# Patient Record
Sex: MALE | Race: ASIAN | Employment: OTHER | URBAN - METROPOLITAN AREA
[De-identification: names, ages, dates, MRNs, and addresses within clinical notes are randomized per-mention and may not be internally consistent; named-entity substitution may affect disease eponyms.]

---

## 2017-05-01 PROBLEM — N40.0 BENIGN NON-NODULAR PROSTATIC HYPERPLASIA WITHOUT LOWER URINARY TRACT SYMPTOMS: Status: ACTIVE | Noted: 2017-05-01

## 2017-05-01 PROBLEM — I70.0 AORTIC ATHEROSCLEROSIS (HCC): Status: ACTIVE | Noted: 2017-05-01

## 2017-05-02 PROCEDURE — 81001 URINALYSIS AUTO W/SCOPE: CPT | Performed by: INTERNAL MEDICINE

## 2017-05-02 PROCEDURE — 82570 ASSAY OF URINE CREATININE: CPT | Performed by: INTERNAL MEDICINE

## 2017-05-02 PROCEDURE — 82746 ASSAY OF FOLIC ACID SERUM: CPT | Performed by: INTERNAL MEDICINE

## 2017-05-02 PROCEDURE — 82607 VITAMIN B-12: CPT | Performed by: INTERNAL MEDICINE

## 2017-05-02 PROCEDURE — 82043 UR ALBUMIN QUANTITATIVE: CPT | Performed by: INTERNAL MEDICINE

## 2017-11-06 PROBLEM — D64.9 CHRONIC ANEMIA: Status: ACTIVE | Noted: 2017-11-06

## 2017-11-06 PROCEDURE — 83036 HEMOGLOBIN GLYCOSYLATED A1C: CPT | Performed by: INTERNAL MEDICINE

## 2017-12-12 ENCOUNTER — APPOINTMENT (OUTPATIENT)
Dept: LAB | Age: 82
End: 2017-12-12
Attending: OPHTHALMOLOGY
Payer: MEDICARE

## 2017-12-12 DIAGNOSIS — H10.9 BACTERIAL CONJUNCTIVITIS: ICD-10-CM

## 2017-12-12 PROCEDURE — 87205 SMEAR GRAM STAIN: CPT

## 2017-12-12 PROCEDURE — 87070 CULTURE OTHR SPECIMN AEROBIC: CPT

## 2017-12-22 ENCOUNTER — APPOINTMENT (OUTPATIENT)
Dept: LAB | Age: 82
End: 2017-12-22
Attending: OPHTHALMOLOGY
Payer: MEDICARE

## 2017-12-22 DIAGNOSIS — H04.332 ACUTE CANALICULITIS OF LEFT LACRIMAL PASSAGE: ICD-10-CM

## 2017-12-22 PROCEDURE — 87077 CULTURE AEROBIC IDENTIFY: CPT

## 2017-12-22 PROCEDURE — 87205 SMEAR GRAM STAIN: CPT

## 2017-12-22 PROCEDURE — 87070 CULTURE OTHR SPECIMN AEROBIC: CPT

## 2018-05-07 PROBLEM — N40.0 BENIGN NON-NODULAR PROSTATIC HYPERPLASIA WITHOUT LOWER URINARY TRACT SYMPTOMS: Status: RESOLVED | Noted: 2017-05-01 | Resolved: 2018-05-07

## 2018-05-07 PROBLEM — N18.30 CKD (CHRONIC KIDNEY DISEASE) STAGE 3, GFR 30-59 ML/MIN (HCC): Status: ACTIVE | Noted: 2018-05-07

## 2018-05-07 PROBLEM — R09.82 POSTNASAL DRIP: Status: ACTIVE | Noted: 2018-05-07

## 2018-06-26 PROBLEM — D69.6 THROMBOCYTOPENIA (HCC): Status: ACTIVE | Noted: 2018-06-26

## 2018-06-26 PROBLEM — N18.30 CKD (CHRONIC KIDNEY DISEASE) STAGE 3, GFR 30-59 ML/MIN (HCC): Status: RESOLVED | Noted: 2018-05-07 | Resolved: 2018-06-26

## 2018-06-26 PROCEDURE — 82043 UR ALBUMIN QUANTITATIVE: CPT | Performed by: INTERNAL MEDICINE

## 2018-06-26 PROCEDURE — 82746 ASSAY OF FOLIC ACID SERUM: CPT | Performed by: INTERNAL MEDICINE

## 2018-06-26 PROCEDURE — 82570 ASSAY OF URINE CREATININE: CPT | Performed by: INTERNAL MEDICINE

## 2018-06-26 PROCEDURE — 82607 VITAMIN B-12: CPT | Performed by: INTERNAL MEDICINE

## 2018-06-26 PROCEDURE — 81001 URINALYSIS AUTO W/SCOPE: CPT | Performed by: INTERNAL MEDICINE

## 2018-06-27 PROCEDURE — 36415 COLL VENOUS BLD VENIPUNCTURE: CPT | Performed by: INTERNAL MEDICINE

## 2018-06-27 PROCEDURE — 83036 HEMOGLOBIN GLYCOSYLATED A1C: CPT | Performed by: INTERNAL MEDICINE

## 2018-06-28 PROBLEM — E53.8 LOW FOLATE: Status: ACTIVE | Noted: 2018-06-28

## 2018-11-20 PROBLEM — R63.4 WEIGHT LOSS: Status: ACTIVE | Noted: 2018-11-20

## 2018-11-20 PROBLEM — R41.3 MEMORY LOSS: Status: ACTIVE | Noted: 2018-11-20

## 2018-11-20 PROCEDURE — 86334 IMMUNOFIX E-PHORESIS SERUM: CPT | Performed by: INTERNAL MEDICINE

## 2018-11-20 PROCEDURE — 84165 PROTEIN E-PHORESIS SERUM: CPT | Performed by: INTERNAL MEDICINE

## 2018-11-20 PROCEDURE — 83883 ASSAY NEPHELOMETRY NOT SPEC: CPT | Performed by: INTERNAL MEDICINE

## 2018-11-21 PROCEDURE — 36415 COLL VENOUS BLD VENIPUNCTURE: CPT | Performed by: INTERNAL MEDICINE

## 2018-11-21 PROCEDURE — 83036 HEMOGLOBIN GLYCOSYLATED A1C: CPT | Performed by: INTERNAL MEDICINE

## 2019-02-20 PROCEDURE — 86334 IMMUNOFIX E-PHORESIS SERUM: CPT | Performed by: INTERNAL MEDICINE

## 2019-02-20 PROCEDURE — 83883 ASSAY NEPHELOMETRY NOT SPEC: CPT | Performed by: INTERNAL MEDICINE

## 2019-02-20 PROCEDURE — 84165 PROTEIN E-PHORESIS SERUM: CPT | Performed by: INTERNAL MEDICINE

## 2019-02-20 PROCEDURE — 83036 HEMOGLOBIN GLYCOSYLATED A1C: CPT | Performed by: INTERNAL MEDICINE

## 2019-06-19 PROBLEM — R09.82 POSTNASAL DRIP: Status: RESOLVED | Noted: 2018-05-07 | Resolved: 2019-06-19

## 2019-06-19 PROBLEM — R63.4 WEIGHT LOSS: Status: RESOLVED | Noted: 2018-11-20 | Resolved: 2019-06-19

## 2019-06-20 PROCEDURE — 83036 HEMOGLOBIN GLYCOSYLATED A1C: CPT | Performed by: INTERNAL MEDICINE

## 2019-10-01 PROCEDURE — 83036 HEMOGLOBIN GLYCOSYLATED A1C: CPT | Performed by: INTERNAL MEDICINE

## 2019-12-11 PROBLEM — F32.9 MAJOR DEPRESSION: Status: ACTIVE | Noted: 2019-12-11

## 2020-07-16 PROBLEM — R41.3 MEMORY LOSS: Status: RESOLVED | Noted: 2018-11-20 | Resolved: 2020-07-16

## 2020-07-16 PROBLEM — F03.90 DEMENTIA WITHOUT BEHAVIORAL DISTURBANCE, UNSPECIFIED DEMENTIA TYPE: Status: ACTIVE | Noted: 2020-07-16

## 2020-07-16 PROBLEM — F03.90 DEMENTIA WITHOUT BEHAVIORAL DISTURBANCE, UNSPECIFIED DEMENTIA TYPE (HCC): Status: ACTIVE | Noted: 2020-07-16

## 2021-05-17 PROBLEM — F32.9 MAJOR DEPRESSION: Status: RESOLVED | Noted: 2019-12-11 | Resolved: 2021-05-17

## 2021-05-17 PROBLEM — F32.A MILD DEPRESSION: Status: ACTIVE | Noted: 2021-05-17

## 2021-06-03 PROBLEM — H90.3 SENSORY HEARING LOSS, BILATERAL: Status: ACTIVE | Noted: 2021-06-03

## 2021-06-03 PROBLEM — H61.23 IMPACTED CERUMEN, BILATERAL: Status: ACTIVE | Noted: 2021-06-03

## 2022-09-04 ENCOUNTER — APPOINTMENT (OUTPATIENT)
Dept: CT IMAGING | Facility: HOSPITAL | Age: 87
End: 2022-09-04
Attending: EMERGENCY MEDICINE

## 2022-09-04 ENCOUNTER — APPOINTMENT (OUTPATIENT)
Dept: GENERAL RADIOLOGY | Facility: HOSPITAL | Age: 87
DRG: 057 | End: 2022-09-04
Attending: EMERGENCY MEDICINE

## 2022-09-04 ENCOUNTER — APPOINTMENT (OUTPATIENT)
Dept: GENERAL RADIOLOGY | Facility: HOSPITAL | Age: 87
End: 2022-09-04
Attending: EMERGENCY MEDICINE

## 2022-09-04 ENCOUNTER — APPOINTMENT (OUTPATIENT)
Dept: CT IMAGING | Facility: HOSPITAL | Age: 87
DRG: 057 | End: 2022-09-04
Attending: EMERGENCY MEDICINE

## 2022-09-04 ENCOUNTER — HOSPITAL ENCOUNTER (INPATIENT)
Facility: HOSPITAL | Age: 87
LOS: 1 days | Discharge: HOME | DRG: 057 | End: 2022-09-05
Attending: EMERGENCY MEDICINE | Admitting: HOSPITALIST

## 2022-09-04 ENCOUNTER — HOSPITAL ENCOUNTER (OUTPATIENT)
Facility: HOSPITAL | Age: 87
Setting detail: OBSERVATION
Discharge: HOME | DRG: 057 | End: 2022-09-05
Attending: EMERGENCY MEDICINE | Admitting: HOSPITALIST

## 2022-09-04 ENCOUNTER — HOSPITAL ENCOUNTER (INPATIENT)
Facility: HOSPITAL | Age: 87
LOS: 1 days | Discharge: HOME | End: 2022-09-05
Attending: EMERGENCY MEDICINE | Admitting: HOSPITALIST

## 2022-09-04 DIAGNOSIS — G47.00 INSOMNIA, UNSPECIFIED TYPE: ICD-10-CM

## 2022-09-04 DIAGNOSIS — R29.6 FALLS FREQUENTLY: ICD-10-CM

## 2022-09-04 DIAGNOSIS — R41.82 ALTERED MENTAL STATUS, UNSPECIFIED ALTERED MENTAL STATUS TYPE: Primary | ICD-10-CM

## 2022-09-04 LAB
ALBUMIN SERPL-MCNC: 3.1 G/DL (ref 3.4–5)
ALBUMIN/GLOB SERPL: 0.8 {RATIO} (ref 1–2)
ALP LIVER SERPL-CCNC: 49 U/L
ALT SERPL-CCNC: 25 U/L
ANION GAP SERPL CALC-SCNC: 5 MMOL/L (ref 0–18)
AST SERPL-CCNC: 22 U/L (ref 15–37)
BASOPHILS # BLD AUTO: 0.04 X10(3) UL (ref 0–0.2)
BASOPHILS NFR BLD AUTO: 0.6 %
BILIRUB SERPL-MCNC: 0.3 MG/DL (ref 0.1–2)
BILIRUB UR QL STRIP.AUTO: NEGATIVE
BUN BLD-MCNC: 16 MG/DL (ref 7–18)
CALCIUM BLD-MCNC: 8.8 MG/DL (ref 8.5–10.1)
CHLORIDE SERPL-SCNC: 98 MMOL/L (ref 98–112)
CLARITY UR REFRACT.AUTO: CLEAR
CO2 SERPL-SCNC: 30 MMOL/L (ref 21–32)
COLOR UR AUTO: YELLOW
CREAT BLD-MCNC: 1.1 MG/DL
EOSINOPHIL # BLD AUTO: 0.11 X10(3) UL (ref 0–0.7)
EOSINOPHIL NFR BLD AUTO: 1.8 %
ERYTHROCYTE [DISTWIDTH] IN BLOOD BY AUTOMATED COUNT: 12.9 %
GFR SERPLBLD BASED ON 1.73 SQ M-ARVRAT: 64 ML/MIN/1.73M2 (ref 60–?)
GLOBULIN PLAS-MCNC: 3.8 G/DL (ref 2.8–4.4)
GLUCOSE BLD-MCNC: 126 MG/DL (ref 70–99)
GLUCOSE BLD-MCNC: 160 MG/DL (ref 70–99)
GLUCOSE BLD-MCNC: 167 MG/DL (ref 70–99)
GLUCOSE UR STRIP.AUTO-MCNC: 250 MG/DL
HCT VFR BLD AUTO: 36.5 %
HGB BLD-MCNC: 12 G/DL
IMM GRANULOCYTES # BLD AUTO: 0.01 X10(3) UL (ref 0–1)
IMM GRANULOCYTES NFR BLD: 0.2 %
KETONES UR STRIP.AUTO-MCNC: NEGATIVE MG/DL
LEUKOCYTE ESTERASE UR QL STRIP.AUTO: NEGATIVE
LYMPHOCYTES # BLD AUTO: 1.69 X10(3) UL (ref 1–4)
LYMPHOCYTES NFR BLD AUTO: 27.3 %
MCH RBC QN AUTO: 32 PG (ref 26–34)
MCHC RBC AUTO-ENTMCNC: 32.9 G/DL (ref 31–37)
MCV RBC AUTO: 97.3 FL
MONOCYTES # BLD AUTO: 0.4 X10(3) UL (ref 0.1–1)
MONOCYTES NFR BLD AUTO: 6.5 %
NEUTROPHILS # BLD AUTO: 3.93 X10 (3) UL (ref 1.5–7.7)
NEUTROPHILS # BLD AUTO: 3.93 X10(3) UL (ref 1.5–7.7)
NEUTROPHILS NFR BLD AUTO: 63.6 %
NITRITE UR QL STRIP.AUTO: NEGATIVE
NT-PROBNP SERPL-MCNC: 401 PG/ML (ref ?–450)
OSMOLALITY SERPL CALC.SUM OF ELEC: 281 MOSM/KG (ref 275–295)
PH UR STRIP.AUTO: 6.5 [PH] (ref 5–8)
PLATELET # BLD AUTO: 199 10(3)UL (ref 150–450)
POTASSIUM SERPL-SCNC: 4 MMOL/L (ref 3.5–5.1)
PROT SERPL-MCNC: 6.9 G/DL (ref 6.4–8.2)
PROT UR STRIP.AUTO-MCNC: NEGATIVE MG/DL
RBC # BLD AUTO: 3.75 X10(6)UL
RBC UR QL AUTO: NEGATIVE
SARS-COV-2 RNA RESP QL NAA+PROBE: NOT DETECTED
SODIUM SERPL-SCNC: 133 MMOL/L (ref 136–145)
SP GR UR STRIP.AUTO: 1.02 (ref 1–1.03)
TROPONIN I HIGH SENSITIVITY: 16 NG/L
UROBILINOGEN UR STRIP.AUTO-MCNC: 0.2 MG/DL
WBC # BLD AUTO: 6.2 X10(3) UL (ref 4–11)

## 2022-09-04 PROCEDURE — 85025 COMPLETE CBC W/AUTO DIFF WBC: CPT | Performed by: EMERGENCY MEDICINE

## 2022-09-04 PROCEDURE — 36415 COLL VENOUS BLD VENIPUNCTURE: CPT

## 2022-09-04 PROCEDURE — 71045 X-RAY EXAM CHEST 1 VIEW: CPT | Performed by: EMERGENCY MEDICINE

## 2022-09-04 PROCEDURE — 82607 VITAMIN B-12: CPT | Performed by: OTHER

## 2022-09-04 PROCEDURE — 83880 ASSAY OF NATRIURETIC PEPTIDE: CPT | Performed by: EMERGENCY MEDICINE

## 2022-09-04 PROCEDURE — 93005 ELECTROCARDIOGRAM TRACING: CPT

## 2022-09-04 PROCEDURE — 81003 URINALYSIS AUTO W/O SCOPE: CPT | Performed by: EMERGENCY MEDICINE

## 2022-09-04 PROCEDURE — 80053 COMPREHEN METABOLIC PANEL: CPT | Performed by: EMERGENCY MEDICINE

## 2022-09-04 PROCEDURE — 84484 ASSAY OF TROPONIN QUANT: CPT | Performed by: EMERGENCY MEDICINE

## 2022-09-04 PROCEDURE — 82962 GLUCOSE BLOOD TEST: CPT

## 2022-09-04 PROCEDURE — 70450 CT HEAD/BRAIN W/O DYE: CPT | Performed by: EMERGENCY MEDICINE

## 2022-09-04 PROCEDURE — 99285 EMERGENCY DEPT VISIT HI MDM: CPT

## 2022-09-04 PROCEDURE — 72100 X-RAY EXAM L-S SPINE 2/3 VWS: CPT | Performed by: EMERGENCY MEDICINE

## 2022-09-04 RX ORDER — DEXTROSE MONOHYDRATE 25 G/50ML
50 INJECTION, SOLUTION INTRAVENOUS
Status: DISCONTINUED | OUTPATIENT
Start: 2022-09-04 | End: 2022-09-05

## 2022-09-04 RX ORDER — MEMANTINE HYDROCHLORIDE 10 MG/1
10 TABLET ORAL EVERY EVENING
Status: DISCONTINUED | OUTPATIENT
Start: 2022-09-04 | End: 2022-09-05

## 2022-09-04 RX ORDER — MEMANTINE HYDROCHLORIDE 5 MG/1
5 TABLET ORAL DAILY
Status: DISCONTINUED | OUTPATIENT
Start: 2022-09-04 | End: 2022-09-05

## 2022-09-04 RX ORDER — MEMANTINE HYDROCHLORIDE 21 MG/1
21 CAPSULE, EXTENDED RELEASE ORAL DAILY
COMMUNITY
Start: 2022-08-27

## 2022-09-04 RX ORDER — QUETIAPINE FUMARATE 25 MG/1
50 TABLET, FILM COATED ORAL NIGHTLY
Status: DISCONTINUED | OUTPATIENT
Start: 2022-09-04 | End: 2022-09-04

## 2022-09-04 RX ORDER — NICOTINE POLACRILEX 4 MG
30 LOZENGE BUCCAL
Status: DISCONTINUED | OUTPATIENT
Start: 2022-09-04 | End: 2022-09-05

## 2022-09-04 RX ORDER — BISACODYL 10 MG
10 SUPPOSITORY, RECTAL RECTAL
Status: DISCONTINUED | OUTPATIENT
Start: 2022-09-04 | End: 2022-09-05

## 2022-09-04 RX ORDER — ACETAMINOPHEN 500 MG
500 TABLET ORAL EVERY 4 HOURS PRN
Status: DISCONTINUED | OUTPATIENT
Start: 2022-09-04 | End: 2022-09-05

## 2022-09-04 RX ORDER — HYDRALAZINE HYDROCHLORIDE 20 MG/ML
10 INJECTION INTRAMUSCULAR; INTRAVENOUS EVERY 6 HOURS PRN
Status: DISCONTINUED | OUTPATIENT
Start: 2022-09-04 | End: 2022-09-05

## 2022-09-04 RX ORDER — SENNOSIDES 8.6 MG
17.2 TABLET ORAL NIGHTLY PRN
Status: DISCONTINUED | OUTPATIENT
Start: 2022-09-04 | End: 2022-09-05

## 2022-09-04 RX ORDER — ENOXAPARIN SODIUM 100 MG/ML
40 INJECTION SUBCUTANEOUS DAILY
Status: DISCONTINUED | OUTPATIENT
Start: 2022-09-05 | End: 2022-09-05

## 2022-09-04 RX ORDER — QUETIAPINE FUMARATE 25 MG/1
25 TABLET, FILM COATED ORAL NIGHTLY
Status: DISCONTINUED | OUTPATIENT
Start: 2022-09-04 | End: 2022-09-05

## 2022-09-04 RX ORDER — POLYETHYLENE GLYCOL 3350 17 G/17G
17 POWDER, FOR SOLUTION ORAL DAILY PRN
Status: DISCONTINUED | OUTPATIENT
Start: 2022-09-04 | End: 2022-09-05

## 2022-09-04 RX ORDER — SODIUM PHOSPHATE, DIBASIC AND SODIUM PHOSPHATE, MONOBASIC 7; 19 G/133ML; G/133ML
1 ENEMA RECTAL ONCE AS NEEDED
Status: DISCONTINUED | OUTPATIENT
Start: 2022-09-04 | End: 2022-09-05

## 2022-09-04 RX ORDER — NICOTINE POLACRILEX 4 MG
15 LOZENGE BUCCAL
Status: DISCONTINUED | OUTPATIENT
Start: 2022-09-04 | End: 2022-09-05

## 2022-09-04 RX ORDER — LORAZEPAM 2 MG/ML
0.5 CONCENTRATE ORAL NIGHTLY PRN
Status: DISCONTINUED | OUTPATIENT
Start: 2022-09-04 | End: 2022-09-05

## 2022-09-04 NOTE — ED QUICK NOTES
Orders for admission, patient is aware of plan and ready to go upstairs. Any questions, please call ED RN Shaila Domínguez at extension [phone]. Vaccinated? Yes  Type of COVID test sent: Rapid - Not detected  COVID Suspicion level: Low      Titratable drug(s) infusing:  Rate:    LOC at time of transport: Disoriented;  Alzheimer's    Other pertinent information:    CIWA score=  NIH score=

## 2022-09-05 VITALS
HEIGHT: 67 IN | OXYGEN SATURATION: 93 % | TEMPERATURE: 99 F | HEART RATE: 75 BPM | BODY MASS INDEX: 18.21 KG/M2 | DIASTOLIC BLOOD PRESSURE: 60 MMHG | SYSTOLIC BLOOD PRESSURE: 158 MMHG | WEIGHT: 116 LBS | RESPIRATION RATE: 18 BRPM

## 2022-09-05 LAB
ATRIAL RATE: 49 BPM
GLUCOSE BLD-MCNC: 144 MG/DL (ref 70–99)
GLUCOSE BLD-MCNC: 243 MG/DL (ref 70–99)
P AXIS: 43 DEGREES
P-R INTERVAL: 176 MS
Q-T INTERVAL: 470 MS
QRS DURATION: 142 MS
QTC CALCULATION (BEZET): 424 MS
R AXIS: 14 DEGREES
T AXIS: 12 DEGREES
VENTRICULAR RATE: 49 BPM
VIT B12 SERPL-MCNC: 740 PG/ML (ref 193–986)

## 2022-09-05 PROCEDURE — 97161 PT EVAL LOW COMPLEX 20 MIN: CPT

## 2022-09-05 PROCEDURE — 97116 GAIT TRAINING THERAPY: CPT

## 2022-09-05 PROCEDURE — 82962 GLUCOSE BLOOD TEST: CPT

## 2022-09-05 RX ORDER — QUETIAPINE FUMARATE 25 MG/1
TABLET, FILM COATED ORAL
Qty: 30 TABLET | Refills: 0 | Status: SHIPPED | OUTPATIENT
Start: 2022-09-05 | End: 2022-09-05

## 2022-09-05 RX ORDER — QUETIAPINE FUMARATE 25 MG/1
TABLET, FILM COATED ORAL
Qty: 30 TABLET | Refills: 0 | Status: SHIPPED | OUTPATIENT
Start: 2022-09-05

## 2022-09-05 NOTE — PLAN OF CARE
Assumed patient care at [address] [place] [address]. Patient is awake and impulsive. Unable to assess patient's orientation d/t patient being non verbal most of the time. Hx of Alzheimer's. Patient keep trying to get out of bed and keeps removing tele monitor. Posey vest and soft wrist restraints in place for safety (exp 1830). Room air. NSR/ST on Tele. (HR 's). Afebrile. Regular diet. Accu checks QID. Up x 1-2 assist to the bathroom. PT/OT on consult. Fall precautions in place. All needs met at this time. Problem: Diabetes/Glucose Control  Goal: Glucose maintained within prescribed range  Description: INTERVENTIONS:  - Monitor Blood Glucose as ordered  - Assess for signs and symptoms of hyperglycemia and hypoglycemia  - Administer ordered medications to maintain glucose within target range  - Assess barriers to adequate nutritional intake and initiate nutrition consult as needed  - Instruct patient on self management of diabetes  Outcome: Progressing     Problem: Patient/Family Goals  Goal: Patient/Family Long Term Goal  Description: Patient's Long Term Goal: Discharge    Interventions:  - MD Consults  - Fall precautions  - Give meds per protocol  - See additional Care Plan goals for specific interventions  Outcome: Progressing  Goal: Patient/Family Short Term Goal  Description: Patient's Short Term Goal: Prevent fall/injury. Interventions:   - Non violent restraints.   - Fall precautions in place.   - Room close to nurse station.  - Bed in lowest position.  - See additional Care Plan goals for specific interventions  Outcome: Progressing     Problem: Safety Risk - Non-Violent Restraints  Goal: Patient will remain free from self-harm  Description: INTERVENTIONS:  - Apply the least restrictive restraint to prevent harm  - Notify patient and family of reasons restraints applied  - Assess for any contributing factors to confusion (electrolyte disturbances, delirium, medications)  - Discontinue any unnecessary medical devices as soon as possible  - Assess the patient's physical comfort, circulation, skin condition, hydration, nutrition and elimination needs   - Reorient and redirection as needed  - Assess for the need to continue restraints  Outcome: Progressing     Problem: Delirium  Goal: Minimize duration of delirium  Description: Interventions:  - Encourage use of hearing aids, eye glasses  - Promote highest level of mobility daily  - Provide frequent reorientation  - Promote wakefulness i.e. lights on, blinds open  - Promote sleep, encourage patient's normal rest cycle i.e. lights off, TV off, minimize noise and interruptions  - Encourage family to assist in orientation and promotion of home routines  Outcome: Progressing

## 2022-09-05 NOTE — BH PROGRESS NOTE
Referral placed in the discharge summary for the patient to follow up with a Psychiatrist, Dr. Andie Longoria.

## 2022-09-05 NOTE — CM/SW NOTE
09/05/22 1600   CM/SW Referral Data   Referral Source Physician   Reason for Referral Discharge planning   Informant Son   Patient Info   Patient's Current Mental Status at Time of Assessment Alert;Confused or unable to complete assessment   Patient Communication Issues Language barrier   Patient's Home Environment Condo/Apt with elevator   Number of Levels in Home 1   Patient lives with Spouse/Significant other   Patient Status Prior to Admission   Independent with ADLs and Mobility No   Pt. requires assistance with Housework;Driving;Meals; Bathing;Dressing;Medications; Toileting   Services in place prior to admission DME/Supplies at home   Type of DME/Supplies Hollie Gravely   Discharge Needs   Anticipated D/C needs Caregiver services     MDO received for POLST. Spoke w/ son Mima Mckinley for assessment. PT recommending HHC/24 hr supervision. Pt and pt's wife will be staying w/ son Mima Mckinley in [place] for about a week or more if needed. Son requested caregiver resources - provided. Declined HHC services. SW recommend son to f/u with pt's PCP for f/u care and HHC if need in the future. DNAR listed, will need POLST to be completed - not on file. Son to f/u with pt's PCP. SW/CM to remain available for support and/or discharge planning.      Colleen Easley, LSW   Pager: [phone] (9/5/22 only)

## 2022-09-05 NOTE — PROGRESS NOTES
NURSING ADMISSION NOTE      Patient admitted via Cart  Oriented to room. Safety precautions initiated. Bed in low position. Call light in reach. Received patient around 56, accompanied by son Corie Hughes. Pt is nonverbal, is restless, and continues to get out of bed, pt removed gown and tele. Pt speaks Mandarin, son helped answered questions in the navigator. Staff assisted pt to the bathroom. Received orders for vest restraints and soft limb restraints, son was present at bedside when restraints applied.

## 2022-09-05 NOTE — PLAN OF CARE
Pt discharged home via wheelchair accompanied by his son. Prescriptions and discharge instructions given with son verbalizing understanding.

## 2022-09-05 NOTE — PROGRESS NOTES
PSYCH CONSULT    Date of Admission: 9/4/22  Date of Consult: 9/5/22  Reason for Consultation: Dementia    Impression:  Primary Psychiatric Diagnosis:  Severe major neurocognitive disorder. Anxiety disorder unspecified. Rule out acute delirium/encephalopathy due to poor sleep quality. Pertinent Medical Diagnoses:  DM2. HTN. Recommendations:  1) Continue Seroquel but change instructions to 12.5mg to 25mg nightly PRN insomnia or agitation. 2) Continue Zoloft 50mg daily for anxiety. 3) He can be discharged home from the psych perspective. There is no indication for the inpatient psych unit. Recommend that he establish care with a mare-psychiatrist. Discussed plan with patient's wife and son Michael Phillips. Psych Instructions:    1) Start Seroquel/Quetiapine 25mg tabs-- give him 1/2 to 1 tab nightly as needed for insomnia or agitation. 2) Make an appointment with Dr. William Monae, an excellent geriatric psychiatrist who is an expert in dementia. 3) You are taking great care of him. Continue 24 hr supervision. Agree with him living at your house so there is more help. Agree with hiring a caregiver at night to help your mom rest.     4) Continue to play Mahjong with him since he enjoys it and is still able to play. Full note to follow.     Dr. Abel Tracey

## 2022-09-13 NOTE — PAYOR COMM NOTE
--------------  DISCHARGE REVIEW    Payor: Maxim Valencia #:  [id number]  Authorization Number: [id number]    Admit date: 9/4/22  Admit time:   5:49 PM  Discharge Date: 9/5/2022  4:54 PM     Admitting Physician: Zak Delvalle MD  Attending Physician:  No att. providers found  Primary Care Physician: Elisabeth Smith MD    REVIEWER COMMENTS    FOR FINAL REVIEW/APPROVAL OF ALL INPT DAYS

## 2023-05-12 PROBLEM — F03.90 DEMENTIA WITHOUT BEHAVIORAL DISTURBANCE (HCC): Status: ACTIVE | Noted: 2020-07-16

## 2023-08-02 ENCOUNTER — HOSPITAL ENCOUNTER (OUTPATIENT)
Facility: HOSPITAL | Age: 88
Setting detail: OBSERVATION
Discharge: NURSING, REHAB OR LONG-TERM CARE | End: 2023-08-05
Attending: EMERGENCY MEDICINE | Admitting: HOSPITALIST
Payer: MEDICARE

## 2023-08-02 DIAGNOSIS — K92.2 GASTROINTESTINAL HEMORRHAGE, UNSPECIFIED GASTROINTESTINAL HEMORRHAGE TYPE: Primary | ICD-10-CM

## 2023-08-02 LAB
ALBUMIN SERPL-MCNC: 3.6 G/DL (ref 3.4–5)
ALBUMIN/GLOB SERPL: 1 {RATIO} (ref 1–2)
ALP LIVER SERPL-CCNC: 50 U/L
ALT SERPL-CCNC: 32 U/L
ANION GAP SERPL CALC-SCNC: 3 MMOL/L (ref 0–18)
ANTIBODY SCREEN: NEGATIVE
APTT PPP: 29.6 SECONDS (ref 23.3–35.6)
AST SERPL-CCNC: 24 U/L (ref 15–37)
BASOPHILS # BLD AUTO: 0.07 X10(3) UL (ref 0–0.2)
BASOPHILS NFR BLD AUTO: 0.7 %
BILIRUB SERPL-MCNC: 0.4 MG/DL (ref 0.1–2)
BUN BLD-MCNC: 21 MG/DL (ref 7–18)
CALCIUM BLD-MCNC: 9 MG/DL (ref 8.5–10.1)
CHLORIDE SERPL-SCNC: 101 MMOL/L (ref 98–112)
CO2 SERPL-SCNC: 32 MMOL/L (ref 21–32)
CREAT BLD-MCNC: 1.02 MG/DL
EGFRCR SERPLBLD CKD-EPI 2021: 70 ML/MIN/1.73M2 (ref 60–?)
EOSINOPHIL # BLD AUTO: 0.18 X10(3) UL (ref 0–0.7)
EOSINOPHIL NFR BLD AUTO: 1.8 %
ERYTHROCYTE [DISTWIDTH] IN BLOOD BY AUTOMATED COUNT: 12.9 %
GLOBULIN PLAS-MCNC: 3.6 G/DL (ref 2.8–4.4)
GLUCOSE BLD-MCNC: 107 MG/DL (ref 70–99)
HCT VFR BLD AUTO: 32.6 %
HGB BLD-MCNC: 10.7 G/DL
IMM GRANULOCYTES # BLD AUTO: 0.02 X10(3) UL (ref 0–1)
IMM GRANULOCYTES NFR BLD: 0.2 %
INR BLD: 1.09 (ref 0.85–1.16)
LYMPHOCYTES # BLD AUTO: 2.67 X10(3) UL (ref 1–4)
LYMPHOCYTES NFR BLD AUTO: 27.1 %
MCH RBC QN AUTO: 31.8 PG (ref 26–34)
MCHC RBC AUTO-ENTMCNC: 32.8 G/DL (ref 31–37)
MCV RBC AUTO: 96.7 FL
MONOCYTES # BLD AUTO: 0.59 X10(3) UL (ref 0.1–1)
MONOCYTES NFR BLD AUTO: 6 %
NEUTROPHILS # BLD AUTO: 6.34 X10 (3) UL (ref 1.5–7.7)
NEUTROPHILS # BLD AUTO: 6.34 X10(3) UL (ref 1.5–7.7)
NEUTROPHILS NFR BLD AUTO: 64.2 %
OSMOLALITY SERPL CALC.SUM OF ELEC: 285 MOSM/KG (ref 275–295)
PLATELET # BLD AUTO: 201 10(3)UL (ref 150–450)
POTASSIUM SERPL-SCNC: 3.9 MMOL/L (ref 3.5–5.1)
PROT SERPL-MCNC: 7.2 G/DL (ref 6.4–8.2)
PROTHROMBIN TIME: 14.1 SECONDS (ref 11.6–14.8)
RBC # BLD AUTO: 3.37 X10(6)UL
RH BLOOD TYPE: POSITIVE
SARS-COV-2 RNA RESP QL NAA+PROBE: NOT DETECTED
SODIUM SERPL-SCNC: 136 MMOL/L (ref 136–145)
WBC # BLD AUTO: 9.9 X10(3) UL (ref 4–11)

## 2023-08-02 PROCEDURE — 86900 BLOOD TYPING SEROLOGIC ABO: CPT | Performed by: EMERGENCY MEDICINE

## 2023-08-02 PROCEDURE — 96374 THER/PROPH/DIAG INJ IV PUSH: CPT

## 2023-08-02 PROCEDURE — 85025 COMPLETE CBC W/AUTO DIFF WBC: CPT

## 2023-08-02 PROCEDURE — 86901 BLOOD TYPING SEROLOGIC RH(D): CPT

## 2023-08-02 PROCEDURE — C9113 INJ PANTOPRAZOLE SODIUM, VIA: HCPCS | Performed by: EMERGENCY MEDICINE

## 2023-08-02 PROCEDURE — 86850 RBC ANTIBODY SCREEN: CPT

## 2023-08-02 PROCEDURE — 80053 COMPREHEN METABOLIC PANEL: CPT | Performed by: EMERGENCY MEDICINE

## 2023-08-02 PROCEDURE — 85730 THROMBOPLASTIN TIME PARTIAL: CPT | Performed by: EMERGENCY MEDICINE

## 2023-08-02 PROCEDURE — 93010 ELECTROCARDIOGRAM REPORT: CPT

## 2023-08-02 PROCEDURE — 99285 EMERGENCY DEPT VISIT HI MDM: CPT

## 2023-08-02 PROCEDURE — 86850 RBC ANTIBODY SCREEN: CPT | Performed by: EMERGENCY MEDICINE

## 2023-08-02 PROCEDURE — 80053 COMPREHEN METABOLIC PANEL: CPT

## 2023-08-02 PROCEDURE — 86901 BLOOD TYPING SEROLOGIC RH(D): CPT | Performed by: EMERGENCY MEDICINE

## 2023-08-02 PROCEDURE — 86900 BLOOD TYPING SEROLOGIC ABO: CPT

## 2023-08-02 PROCEDURE — 93005 ELECTROCARDIOGRAM TRACING: CPT

## 2023-08-02 PROCEDURE — 85610 PROTHROMBIN TIME: CPT | Performed by: EMERGENCY MEDICINE

## 2023-08-02 PROCEDURE — 85025 COMPLETE CBC W/AUTO DIFF WBC: CPT | Performed by: EMERGENCY MEDICINE

## 2023-08-03 ENCOUNTER — APPOINTMENT (OUTPATIENT)
Dept: CV DIAGNOSTICS | Facility: HOSPITAL | Age: 88
End: 2023-08-03
Attending: HOSPITALIST
Payer: MEDICARE

## 2023-08-03 LAB
ANION GAP SERPL CALC-SCNC: 5 MMOL/L (ref 0–18)
ANION GAP SERPL CALC-SCNC: 5 MMOL/L (ref 0–18)
ATRIAL RATE: 51 BPM
BASOPHILS # BLD AUTO: 0.05 X10(3) UL (ref 0–0.2)
BASOPHILS # BLD AUTO: 0.07 X10(3) UL (ref 0–0.2)
BASOPHILS NFR BLD AUTO: 0.7 %
BASOPHILS NFR BLD AUTO: 1 %
BUN BLD-MCNC: 14 MG/DL (ref 7–18)
BUN BLD-MCNC: 16 MG/DL (ref 7–18)
CALCIUM BLD-MCNC: 8.6 MG/DL (ref 8.5–10.1)
CALCIUM BLD-MCNC: 8.6 MG/DL (ref 8.5–10.1)
CHLORIDE SERPL-SCNC: 104 MMOL/L (ref 98–112)
CHLORIDE SERPL-SCNC: 106 MMOL/L (ref 98–112)
CO2 SERPL-SCNC: 25 MMOL/L (ref 21–32)
CO2 SERPL-SCNC: 26 MMOL/L (ref 21–32)
CREAT BLD-MCNC: 0.82 MG/DL
CREAT BLD-MCNC: 1.03 MG/DL
EGFRCR SERPLBLD CKD-EPI 2021: 69 ML/MIN/1.73M2 (ref 60–?)
EGFRCR SERPLBLD CKD-EPI 2021: 84 ML/MIN/1.73M2 (ref 60–?)
EOSINOPHIL # BLD AUTO: 0.2 X10(3) UL (ref 0–0.7)
EOSINOPHIL # BLD AUTO: 0.22 X10(3) UL (ref 0–0.7)
EOSINOPHIL NFR BLD AUTO: 2.8 %
EOSINOPHIL NFR BLD AUTO: 3.1 %
ERYTHROCYTE [DISTWIDTH] IN BLOOD BY AUTOMATED COUNT: 12.8 %
ERYTHROCYTE [DISTWIDTH] IN BLOOD BY AUTOMATED COUNT: 12.9 %
GLUCOSE BLD-MCNC: 115 MG/DL (ref 70–99)
GLUCOSE BLD-MCNC: 122 MG/DL (ref 70–99)
GLUCOSE BLD-MCNC: 124 MG/DL (ref 70–99)
GLUCOSE BLD-MCNC: 125 MG/DL (ref 70–99)
GLUCOSE BLD-MCNC: 126 MG/DL (ref 70–99)
GLUCOSE BLD-MCNC: 128 MG/DL (ref 70–99)
HCT VFR BLD AUTO: 33.6 %
HCT VFR BLD AUTO: 34.6 %
HGB BLD-MCNC: 10.7 G/DL
HGB BLD-MCNC: 11.1 G/DL
HGB BLD-MCNC: 9.5 G/DL
IMM GRANULOCYTES # BLD AUTO: 0.02 X10(3) UL (ref 0–1)
IMM GRANULOCYTES # BLD AUTO: 0.03 X10(3) UL (ref 0–1)
IMM GRANULOCYTES NFR BLD: 0.3 %
IMM GRANULOCYTES NFR BLD: 0.4 %
LACTATE SERPL-SCNC: 1.1 MMOL/L (ref 0.4–2)
LYMPHOCYTES # BLD AUTO: 2.12 X10(3) UL (ref 1–4)
LYMPHOCYTES # BLD AUTO: 2.75 X10(3) UL (ref 1–4)
LYMPHOCYTES NFR BLD AUTO: 29.8 %
LYMPHOCYTES NFR BLD AUTO: 38.7 %
MCH RBC QN AUTO: 31.5 PG (ref 26–34)
MCH RBC QN AUTO: 31.8 PG (ref 26–34)
MCHC RBC AUTO-ENTMCNC: 31.8 G/DL (ref 31–37)
MCHC RBC AUTO-ENTMCNC: 32.1 G/DL (ref 31–37)
MCV RBC AUTO: 100 FL
MCV RBC AUTO: 98.3 FL
MONOCYTES # BLD AUTO: 0.37 X10(3) UL (ref 0.1–1)
MONOCYTES # BLD AUTO: 0.43 X10(3) UL (ref 0.1–1)
MONOCYTES NFR BLD AUTO: 5.2 %
MONOCYTES NFR BLD AUTO: 6 %
NEUTROPHILS # BLD AUTO: 3.61 X10 (3) UL (ref 1.5–7.7)
NEUTROPHILS # BLD AUTO: 3.61 X10(3) UL (ref 1.5–7.7)
NEUTROPHILS # BLD AUTO: 4.36 X10 (3) UL (ref 1.5–7.7)
NEUTROPHILS # BLD AUTO: 4.36 X10(3) UL (ref 1.5–7.7)
NEUTROPHILS NFR BLD AUTO: 50.8 %
NEUTROPHILS NFR BLD AUTO: 61.2 %
OSMOLALITY SERPL CALC.SUM OF ELEC: 283 MOSM/KG (ref 275–295)
OSMOLALITY SERPL CALC.SUM OF ELEC: 284 MOSM/KG (ref 275–295)
P AXIS: 62 DEGREES
P-R INTERVAL: 176 MS
PLATELET # BLD AUTO: 193 10(3)UL (ref 150–450)
PLATELET # BLD AUTO: 193 10(3)UL (ref 150–450)
POTASSIUM SERPL-SCNC: 3.8 MMOL/L (ref 3.5–5.1)
POTASSIUM SERPL-SCNC: 4.2 MMOL/L (ref 3.5–5.1)
Q-T INTERVAL: 454 MS
QRS DURATION: 134 MS
QTC CALCULATION (BEZET): 418 MS
R AXIS: 30 DEGREES
RBC # BLD AUTO: 3.36 X10(6)UL
RBC # BLD AUTO: 3.52 X10(6)UL
SODIUM SERPL-SCNC: 135 MMOL/L (ref 136–145)
SODIUM SERPL-SCNC: 136 MMOL/L (ref 136–145)
T AXIS: 23 DEGREES
VENTRICULAR RATE: 51 BPM
WBC # BLD AUTO: 7.1 X10(3) UL (ref 4–11)
WBC # BLD AUTO: 7.1 X10(3) UL (ref 4–11)

## 2023-08-03 PROCEDURE — 93010 ELECTROCARDIOGRAM REPORT: CPT | Performed by: INTERNAL MEDICINE

## 2023-08-03 PROCEDURE — 93005 ELECTROCARDIOGRAM TRACING: CPT

## 2023-08-03 PROCEDURE — 85025 COMPLETE CBC W/AUTO DIFF WBC: CPT | Performed by: HOSPITALIST

## 2023-08-03 PROCEDURE — 96375 TX/PRO/DX INJ NEW DRUG ADDON: CPT

## 2023-08-03 PROCEDURE — 83550 IRON BINDING TEST: CPT | Performed by: INTERNAL MEDICINE

## 2023-08-03 PROCEDURE — 82728 ASSAY OF FERRITIN: CPT | Performed by: INTERNAL MEDICINE

## 2023-08-03 PROCEDURE — 80048 BASIC METABOLIC PNL TOTAL CA: CPT | Performed by: HOSPITALIST

## 2023-08-03 PROCEDURE — 85018 HEMOGLOBIN: CPT | Performed by: INTERNAL MEDICINE

## 2023-08-03 PROCEDURE — 82962 GLUCOSE BLOOD TEST: CPT

## 2023-08-03 PROCEDURE — 85025 COMPLETE CBC W/AUTO DIFF WBC: CPT | Performed by: INTERNAL MEDICINE

## 2023-08-03 PROCEDURE — 93306 TTE W/DOPPLER COMPLETE: CPT | Performed by: HOSPITALIST

## 2023-08-03 PROCEDURE — 80048 BASIC METABOLIC PNL TOTAL CA: CPT | Performed by: INTERNAL MEDICINE

## 2023-08-03 PROCEDURE — 83540 ASSAY OF IRON: CPT | Performed by: INTERNAL MEDICINE

## 2023-08-03 PROCEDURE — 96376 TX/PRO/DX INJ SAME DRUG ADON: CPT

## 2023-08-03 PROCEDURE — 83605 ASSAY OF LACTIC ACID: CPT | Performed by: HOSPITALIST

## 2023-08-03 PROCEDURE — 92610 EVALUATE SWALLOWING FUNCTION: CPT

## 2023-08-03 RX ORDER — METOPROLOL TARTRATE 5 MG/5ML
INJECTION INTRAVENOUS
Status: COMPLETED
Start: 2023-08-03 | End: 2023-08-03

## 2023-08-03 RX ORDER — ACETAMINOPHEN 650 MG/1
650 SUPPOSITORY RECTAL EVERY 6 HOURS PRN
COMMUNITY

## 2023-08-03 RX ORDER — MELATONIN
3 NIGHTLY PRN
Status: DISCONTINUED | OUTPATIENT
Start: 2023-08-03 | End: 2023-08-05

## 2023-08-03 RX ORDER — ONDANSETRON 2 MG/ML
4 INJECTION INTRAMUSCULAR; INTRAVENOUS EVERY 6 HOURS PRN
Status: DISCONTINUED | OUTPATIENT
Start: 2023-08-03 | End: 2023-08-03

## 2023-08-03 RX ORDER — MEMANTINE HYDROCHLORIDE 10 MG/1
10 TABLET ORAL EVERY EVENING
Status: DISCONTINUED | OUTPATIENT
Start: 2023-08-03 | End: 2023-08-05

## 2023-08-03 RX ORDER — POTASSIUM CHLORIDE 20 MEQ/1
40 TABLET, EXTENDED RELEASE ORAL ONCE
Status: COMPLETED | OUTPATIENT
Start: 2023-08-03 | End: 2023-08-03

## 2023-08-03 RX ORDER — SERTRALINE HYDROCHLORIDE 25 MG/1
25 TABLET, FILM COATED ORAL DAILY
Status: DISCONTINUED | OUTPATIENT
Start: 2023-08-03 | End: 2023-08-05

## 2023-08-03 RX ORDER — DEXTROSE MONOHYDRATE 25 G/50ML
50 INJECTION, SOLUTION INTRAVENOUS
Status: DISCONTINUED | OUTPATIENT
Start: 2023-08-03 | End: 2023-08-05

## 2023-08-03 RX ORDER — MEMANTINE HYDROCHLORIDE 5 MG/1
5 TABLET ORAL EVERY MORNING
Status: DISCONTINUED | OUTPATIENT
Start: 2023-08-03 | End: 2023-08-05

## 2023-08-03 RX ORDER — SODIUM CHLORIDE 9 MG/ML
INJECTION, SOLUTION INTRAVENOUS CONTINUOUS
Status: DISCONTINUED | OUTPATIENT
Start: 2023-08-03 | End: 2023-08-05

## 2023-08-03 RX ORDER — ONDANSETRON 2 MG/ML
4 INJECTION INTRAMUSCULAR; INTRAVENOUS EVERY 6 HOURS PRN
Status: DISCONTINUED | OUTPATIENT
Start: 2023-08-03 | End: 2023-08-05

## 2023-08-03 RX ORDER — ADENOSINE 3 MG/ML
6 INJECTION, SOLUTION INTRAVENOUS ONCE
Status: COMPLETED | OUTPATIENT
Start: 2023-08-03 | End: 2023-08-03

## 2023-08-03 RX ORDER — ACETAMINOPHEN 500 MG
500 TABLET ORAL EVERY 4 HOURS PRN
Status: DISCONTINUED | OUTPATIENT
Start: 2023-08-03 | End: 2023-08-05

## 2023-08-03 RX ORDER — DOXEPIN HYDROCHLORIDE 50 MG/1
1 CAPSULE ORAL DAILY
Status: DISCONTINUED | OUTPATIENT
Start: 2023-08-03 | End: 2023-08-05

## 2023-08-03 RX ORDER — HYDROCODONE BITARTRATE AND ACETAMINOPHEN 5; 325 MG/1; MG/1
2 TABLET ORAL EVERY 4 HOURS PRN
Status: DISCONTINUED | OUTPATIENT
Start: 2023-08-03 | End: 2023-08-05

## 2023-08-03 RX ORDER — NICOTINE POLACRILEX 4 MG
15 LOZENGE BUCCAL
Status: DISCONTINUED | OUTPATIENT
Start: 2023-08-03 | End: 2023-08-05

## 2023-08-03 RX ORDER — MORPHINE SULFATE 2 MG/ML
1 INJECTION, SOLUTION INTRAMUSCULAR; INTRAVENOUS EVERY 2 HOUR PRN
Status: DISCONTINUED | OUTPATIENT
Start: 2023-08-03 | End: 2023-08-05

## 2023-08-03 RX ORDER — ACETAMINOPHEN 325 MG/1
650 TABLET ORAL EVERY 4 HOURS PRN
Status: DISCONTINUED | OUTPATIENT
Start: 2023-08-03 | End: 2023-08-05

## 2023-08-03 RX ORDER — METOCLOPRAMIDE HYDROCHLORIDE 5 MG/ML
5 INJECTION INTRAMUSCULAR; INTRAVENOUS EVERY 8 HOURS PRN
Status: DISCONTINUED | OUTPATIENT
Start: 2023-08-03 | End: 2023-08-05

## 2023-08-03 RX ORDER — HYDROCODONE BITARTRATE AND ACETAMINOPHEN 5; 325 MG/1; MG/1
1 TABLET ORAL EVERY 4 HOURS PRN
Status: DISCONTINUED | OUTPATIENT
Start: 2023-08-03 | End: 2023-08-05

## 2023-08-03 RX ORDER — METOPROLOL TARTRATE 5 MG/5ML
5 INJECTION INTRAVENOUS ONCE
Status: COMPLETED | OUTPATIENT
Start: 2023-08-03 | End: 2023-08-03

## 2023-08-03 RX ORDER — MORPHINE SULFATE 4 MG/ML
4 INJECTION, SOLUTION INTRAMUSCULAR; INTRAVENOUS EVERY 2 HOUR PRN
Status: DISCONTINUED | OUTPATIENT
Start: 2023-08-03 | End: 2023-08-05

## 2023-08-03 RX ORDER — MORPHINE SULFATE 2 MG/ML
2 INJECTION, SOLUTION INTRAMUSCULAR; INTRAVENOUS EVERY 2 HOUR PRN
Status: DISCONTINUED | OUTPATIENT
Start: 2023-08-03 | End: 2023-08-05

## 2023-08-03 RX ORDER — SENNA AND DOCUSATE SODIUM 50; 8.6 MG/1; MG/1
1 TABLET, FILM COATED ORAL 2 TIMES DAILY
COMMUNITY

## 2023-08-03 RX ORDER — NICOTINE POLACRILEX 4 MG
30 LOZENGE BUCCAL
Status: DISCONTINUED | OUTPATIENT
Start: 2023-08-03 | End: 2023-08-05

## 2023-08-03 NOTE — PROGRESS NOTES
NURSING ADMISSION NOTE      Patient admitted via Cart  Oriented to room. Safety precautions initiated. Bed in low position. Call light in reach. Pt navigator completed with NH paperwork and via phone with NH RN. Pt alert to self, nonverbal (baseline). Understands Mandarin primarily. Confused and impulsive at times, hx of dementia. Room air, . Tele, SB. Afebrile. IVF. IV protonix. Voids, incontinent x2. Briefed, bright red bloody Bms noted. No signs of pain, sob, n/v/d. NPO until GI sees in am. Normally pt is pureed diet, thin liquids. Pt is an order set/feed. Speech and nutrition to eval. Pt up x1 with walker at NH, PT to eval. Pt updated on poc. No further needs at this time. Safety/high fall precautions in place. Video monitoring placed in room. Bed alarm on. Call light within reach.

## 2023-08-03 NOTE — PROGRESS NOTES
Notified that patient with smear of blood per rectum and 1 hour later hypotension and tachycardia requiring adenosine. Vitals out of proportion to amount of blood. BUN not elevated to suggest upper gi bleed. Repeat hgb pending. Spoke to patient's son Sonia Huitron. Talked about risks and benefits of colonoscopy. Patient would not be able to ingest whole prep so would require golytely administration via  NG tube. Sedation risks of irregular heart rhythm, hypotension, apnea, respiratory depression which may require intubation, cpr, defibrillation reviewed. Would need to reverse DNR for procedure only. He will speak to his mother regarding this and make a decision. If rebleeds, recommend stat tagged rbc scan and hgb and page GI on call by calling [phone]. GI on call for [place] updated with patient condition.     Peter Ewing MD  [facility]  [phone]

## 2023-08-03 NOTE — ED INITIAL ASSESSMENT (HPI)
Arrived via EMS from [facility] care unit for reports of 3 episodes of rectal bleed. Per report, pt is at baseline, normally A/Ox1.  Pt in no acute distress

## 2023-08-03 NOTE — PLAN OF CARE
Pt had a small smear of bright red blood and large amount urine in brief. Pt changed and repositioned then approximately 10 minutes later hr went from 50's to 140's, SBP dropped to 90's. Pt baseline nonverbal, no signs of distress noted. Dr. Lorena Amos paged and RRT paged. Pt given lopressor 5 mg IV x 1 and IVF bolus. 12 lead EKG done, cardiology consulted. Pt given adenosine 6 mg IV x 2. Pt converted for a few seconds with first dose and a few minutes with second dose, SBP 80's-90's. Pt has curently remained in sinus rhythm with a hr 55-60's, /56. Pt has orders to transfer to CNICU but is now more hemodynamically stable. Dr. Lorena Amos and cardiology paged for further recommendations.

## 2023-08-03 NOTE — CM/SW NOTE
08/03/23 1100   CM/SW Referral Data   Referral Source Social Work (self-referral)   Reason for Referral Discharge planning   Informant Son;EMR;Clinical Staff Member   Medical Hx   Does patient have an established PCP? Yes   Patient Info   Patient's Current Mental Status at Time of Assessment Memory Impairments   Patient's [facility] Care Provider Upon Admission [facility]   Discharge Needs   Anticipated D/C needs Long term care facility; To be determined     Pt is a 81 y/o male admitted with GI hemorrhage. Chart reviewed for discharge planning needs and noted pt has hx of dementia and is a LTC resident at [facility]. BLAKE spoke with MBJYOTHI Phillips who clarified pt is LTC resident at [facility]. BLAKE spoke with pt's son Corie Hughes via phone who confirmed pt is a LTC resident at [facility] and pt will return to [facility] at discharge. Referral sent to [facility] in [address]. BLAKE will continue to follow.      ISABELLE Morales  Discharge Planner

## 2023-08-03 NOTE — ED QUICK NOTES
Pt noted with a small amount of bright red blood in his diaper. Pt kept clean and dry.  New adult brief applied

## 2023-08-03 NOTE — SLP NOTE
ADULT SWALLOWING EVALUATION    ASSESSMENT    ASSESSMENT/OVERALL IMPRESSION:  Patient is an 81 y/o male admitted with GIB and PMHx significant for Dementia. SLP order received to evaluate oropharyngeal swallow d/t history of modified diet. Paperwork from patient's SNF indicated a pureed diet and thin liquids at baseline. Patient's son at bedside confirmed a pureed diet and thin liquids that has been ongoing for some time. Son reported good tolerance of modified diet without overt s/s of aspiration. Patient presenting with baseline oropharyngeal swallow function. Bolus acceptance was adequate without evidence of anterior bolus loss. Pharyngeal swallow initiation appeared timely and hyolaryngeal excursion was adequate per palpation. No overt s/s of aspiration observed and patient denied odynophagia and globus sensation across consistencies. Recommend patient continue a pureed diet and thin liquids when cleared by GI. No further SLP services warranted at this time as patient appears at baseline. Education provided re: results and recommendations. RECOMMENDATIONS   Diet Recommendations - Solids: Puree (when cleared by GI)  Diet Recommendations - Liquids:  Thin Liquids                        Compensatory Strategies Recommended: Small bites and sips  Aspiration Precautions: Upright position  Medication Administration Recommendations: One pill at a time  Treatment Plan/Recommendations: No further inpatient SLP service warranted       HISTORY   MEDICAL HISTORY  Reason for Referral: Altered diet consistency    Problem List  Principal Problem:    Gastrointestinal hemorrhage, unspecified gastrointestinal hemorrhage type      Past Medical History  Past Medical History:   Diagnosis Date    Alzheimer disease ([address]     Aortic atherosclerosis ([address]     declines cardiac calcium score    Aortic ectasia ([facility] 12/2015    noted on CXR    BPH (benign prostatic hyperplasia)     Chronic anemia     DDD (degenerative disc disease), lumbar 8/2012 MRI    History of cardiovascular stress test 2013    normal    Impotence of organic origin     Memory loss     Other and unspecified hyperlipidemia     PAC (premature atrial contraction)     PSVT (paroxysmal supraventricular tachycardia) (HCC)     PVC's (premature ventricular contractions)     RBBB     Type II or unspecified type diabetes mellitus without mention of complication, not stated as uncontrolled     Unspecified essential hypertension        Prior Living Situation: Skilled nursing facility  Diet Prior to Admission: Puree; Thin liquids       Patient/Family Goals: none stated    SWALLOWING HISTORY  Current Diet Consistency:  (clear liquid)  Dysphagia History: as above  Imaging Results: NA      SUBJECTIVE       OBJECTIVE   ORAL MOTOR EXAMINATION                         Respiratory Status: Unlabored  Consistencies Trialed: Thin liquids;Puree  Method of Presentation: Staff/Clinician assistance  Patient Positioning: Upright;Leaning to right    Oral Phase of Swallow: Within Functional Limits (w/ baseline diet consistencies)                      Pharyngeal Phase of Swallow: Within Functional Limits (w/ baseline diet consistencies)           (Please note: Silent aspiration cannot be evaluated clinically.  Videofluoroscopic Swallow Study is required to rule-out silent aspiration.)    Esophageal Phase of Swallow: No complaints consistent with possible esophageal involvement  Comments: d/w RN              FOLLOW UP  Treatment Plan/Recommendations: No further inpatient SLP service warranted     Follow Up Needed (Documentation Required): No       Thank you for your referral.   If you have any questions, please contact SHAMA Ruff

## 2023-08-03 NOTE — ED QUICK NOTES
Orders for admission, patient is aware of plan and ready to go upstairs. Any questions, please call ED RN Walter Coello at extension [phone].      Patient Covid vaccination status: Fully vaccinated     COVID Test Ordered in ED: Rapid SARS-CoV-2 by PCR    COVID Suspicion at Admission: N/A    Running Infusions:  None    Mental Status/LOC at time of transport: Alert, oriented to self only    Other pertinent information:   CIWA score: N/A   NIH score:  N/A

## 2023-08-03 NOTE — OCCUPATIONAL THERAPY NOTE
OT orders received and pt chart reviewed. Pt's son contacted via phone for PLOF. Pt is wheelchair bound at baseline and transfers with one person assist and gait belt, stand pivot. Pt requires assist for all ADLs. Pt has been in LTC since early December 2022. Pt family visits almost daily to assist with feeding around dinner time. Pt's son requests pt rest today and no undergo therapy evaluation, deferring evaluation until tomorrow. OT will continue to follow and re-attempt at later date.

## 2023-08-04 LAB
ATRIAL RATE: 79 BPM
BASOPHILS # BLD AUTO: 0.08 X10(3) UL (ref 0–0.2)
BASOPHILS NFR BLD AUTO: 1.2 %
DEPRECATED HBV CORE AB SER IA-ACNC: 203.7 NG/ML
EOSINOPHIL # BLD AUTO: 0.37 X10(3) UL (ref 0–0.7)
EOSINOPHIL NFR BLD AUTO: 5.6 %
ERYTHROCYTE [DISTWIDTH] IN BLOOD BY AUTOMATED COUNT: 12.7 %
GLUCOSE BLD-MCNC: 111 MG/DL (ref 70–99)
GLUCOSE BLD-MCNC: 111 MG/DL (ref 70–99)
GLUCOSE BLD-MCNC: 114 MG/DL (ref 70–99)
GLUCOSE BLD-MCNC: 177 MG/DL (ref 70–99)
HCT VFR BLD AUTO: 31.8 %
HGB BLD-MCNC: 10.1 G/DL
HGB BLD-MCNC: 10.4 G/DL
HGB BLD-MCNC: 10.4 G/DL
IMM GRANULOCYTES # BLD AUTO: 0.02 X10(3) UL (ref 0–1)
IMM GRANULOCYTES NFR BLD: 0.3 %
IRON SATN MFR SERPL: 30 %
IRON SERPL-MCNC: 85 UG/DL
LYMPHOCYTES # BLD AUTO: 2.21 X10(3) UL (ref 1–4)
LYMPHOCYTES NFR BLD AUTO: 33.5 %
MCH RBC QN AUTO: 31.6 PG (ref 26–34)
MCHC RBC AUTO-ENTMCNC: 32.7 G/DL (ref 31–37)
MCV RBC AUTO: 96.7 FL
MONOCYTES # BLD AUTO: 0.41 X10(3) UL (ref 0.1–1)
MONOCYTES NFR BLD AUTO: 6.2 %
NEUTROPHILS # BLD AUTO: 3.51 X10 (3) UL (ref 1.5–7.7)
NEUTROPHILS # BLD AUTO: 3.51 X10(3) UL (ref 1.5–7.7)
NEUTROPHILS NFR BLD AUTO: 53.2 %
PLATELET # BLD AUTO: 178 10(3)UL (ref 150–450)
POTASSIUM SERPL-SCNC: 3.8 MMOL/L (ref 3.5–5.1)
Q-T INTERVAL: 324 MS
QRS DURATION: 122 MS
QTC CALCULATION (BEZET): 503 MS
R AXIS: 113 DEGREES
RBC # BLD AUTO: 3.29 X10(6)UL
T AXIS: -54 DEGREES
TIBC SERPL-MCNC: 280 UG/DL (ref 240–450)
TRANSFERRIN SERPL-MCNC: 188 MG/DL (ref 200–360)
VENTRICULAR RATE: 145 BPM
WBC # BLD AUTO: 6.6 X10(3) UL (ref 4–11)

## 2023-08-04 PROCEDURE — 85025 COMPLETE CBC W/AUTO DIFF WBC: CPT | Performed by: INTERNAL MEDICINE

## 2023-08-04 PROCEDURE — 82962 GLUCOSE BLOOD TEST: CPT

## 2023-08-04 PROCEDURE — 84132 ASSAY OF SERUM POTASSIUM: CPT | Performed by: HOSPITALIST

## 2023-08-04 PROCEDURE — 85018 HEMOGLOBIN: CPT | Performed by: INTERNAL MEDICINE

## 2023-08-04 RX ORDER — POTASSIUM CHLORIDE 1.5 G/1.58G
40 POWDER, FOR SOLUTION ORAL ONCE
Status: DISCONTINUED | OUTPATIENT
Start: 2023-08-04 | End: 2023-08-04

## 2023-08-04 RX ORDER — SERTRALINE HYDROCHLORIDE 25 MG/1
25 TABLET, FILM COATED ORAL DAILY
COMMUNITY

## 2023-08-04 RX ORDER — POTASSIUM CHLORIDE 20 MEQ/1
40 TABLET, EXTENDED RELEASE ORAL ONCE
Status: COMPLETED | OUTPATIENT
Start: 2023-08-04 | End: 2023-08-04

## 2023-08-04 RX ORDER — POTASSIUM CHLORIDE 20 MEQ/1
40 TABLET, EXTENDED RELEASE ORAL ONCE
Status: DISCONTINUED | OUTPATIENT
Start: 2023-08-04 | End: 2023-08-04

## 2023-08-04 RX ORDER — AMLODIPINE BESYLATE 2.5 MG/1
2.5 TABLET ORAL DAILY
Status: DISCONTINUED | OUTPATIENT
Start: 2023-08-04 | End: 2023-08-05

## 2023-08-04 RX ORDER — LANSOPRAZOLE 30 MG/1
30 TABLET, ORALLY DISINTEGRATING, DELAYED RELEASE ORAL
Status: DISCONTINUED | OUTPATIENT
Start: 2023-08-05 | End: 2023-08-05

## 2023-08-04 NOTE — OCCUPATIONAL THERAPY NOTE
Received order for OT evaluation. Another OT spoke with patient's son on 8/3/23. Per OT contact note on 8/3/23, \"Pt is wheelchair bound at baseline and transfers with one person assist and gait belt, stand pivot. Pt requires assist for all ADLs. Pt has been in LTC since early December 2022. Pt family visits almost daily to assist with feeding around dinner time. \" This OT spoke with Juan Norman, son, on the phone. Son confirmed that above information and that he is ok with OT and PT signing off. Patient is at his baseline level. No skilled OT needs at this time. Informed RN.

## 2023-08-04 NOTE — PLAN OF CARE
RN SHIFT NOTE    Assumed care of pt at 0700. Pt does not appear to be in pain. Patient is alert to self only and nonverbal. SB with a right BBB on tele, S1 and S2 present. No edema present. Lung sounds clear. Abdomen soft and flat. Scattered bruising on skin. Tolerating medications and care needs have been met at this time. POC: Hgb q 12, monitor stools, PT/OT to see. Problem: CARDIOVASCULAR - ADULT  Goal: Maintains optimal cardiac output and hemodynamic stability  Description: INTERVENTIONS:  - Monitor vital signs, rhythm, and trends  - Monitor for bleeding, hypotension and signs of decreased cardiac output  - Evaluate effectiveness of vasoactive medications to optimize hemodynamic stability  - Monitor arterial and/or venous puncture sites for bleeding and/or hematoma  - Assess quality of pulses, skin color and temperature  - Assess for signs of decreased coronary artery perfusion - ex.  Angina  - Evaluate fluid balance, assess for edema, trend weights  8/4/2023 1102 by Kelsey Jasso RN  Outcome: Progressing  8/4/2023 1100 by Kelsey Jasso RN  Outcome: Progressing  Goal: Absence of cardiac arrhythmias or at baseline  Description: INTERVENTIONS:  - Continuous cardiac monitoring, monitor vital signs, obtain 12 lead EKG if indicated  - Evaluate effectiveness of antiarrhythmic and heart rate control medications as ordered  - Initiate emergency measures for life threatening arrhythmias  - Monitor electrolytes and administer replacement therapy as ordered  8/4/2023 1102 by Kelsey Jasso RN  Outcome: Progressing  8/4/2023 1100 by Kelsey Jasso RN  Outcome: Progressing     Problem: GASTROINTESTINAL - ADULT  Goal: Maintains or returns to baseline bowel function  Description: INTERVENTIONS:  - Assess bowel function  - Maintain adequate hydration with IV or PO as ordered and tolerated  - Evaluate effectiveness of GI medications  - Encourage mobilization and activity  - Obtain nutritional consult as needed  - Establish a toileting routine/schedule  - Consider collaborating with pharmacy to review patient's medication profile  8/4/2023 1102 by Kamaljit Oden RN  Outcome: Progressing  8/4/2023 1100 by Kamaljit Oden RN  Outcome: Progressing  Goal: Maintains adequate nutritional intake (undernourished)  Description: INTERVENTIONS:  - Monitor percentage of each meal consumed  - Identify factors contributing to decreased intake, treat as appropriate  - Assist with meals as needed  - Monitor I&O, WT and lab values  - Obtain nutritional consult as needed  - Optimize oral hygiene and moisture  - Encourage food from home; allow for food preferences  - Enhance eating environment  8/4/2023 1102 by Kamaljit Oden RN  Outcome: Progressing  8/4/2023 1100 by Kamaljit Oden RN  Outcome: Progressing     Problem: METABOLIC/FLUID AND ELECTROLYTES - ADULT  Goal: Glucose maintained within prescribed range  Description: INTERVENTIONS:  - Monitor Blood Glucose as ordered  - Assess for signs and symptoms of hyperglycemia and hypoglycemia  - Administer ordered medications to maintain glucose within target range  - Assess barriers to adequate nutritional intake and initiate nutrition consult as needed  - Instruct patient on self management of diabetes  8/4/2023 1102 by Kamaljit Oden RN  Outcome: Progressing  8/4/2023 1100 by Kamaljit Oden RN  Outcome: Progressing  Goal: Electrolytes maintained within normal limits  Description: INTERVENTIONS:  - Monitor labs and rhythm and assess patient for signs and symptoms of electrolyte imbalances  - Administer electrolyte replacement as ordered  - Monitor response to electrolyte replacements, including rhythm and repeat lab results as appropriate  - Fluid restriction as ordered  - Instruct patient on fluid and nutrition restrictions as appropriate  8/4/2023 1102 by Kamaljit Oden RN  Outcome: Progressing  8/4/2023 1100 by Kamaljit Oden RN  Outcome: Progressing  Goal: Hemodynamic stability and optimal renal function maintained  Description: INTERVENTIONS:  - Monitor labs and assess for signs and symptoms of volume excess or deficit  - Monitor intake, output and patient weight  - Monitor urine specific gravity, serum osmolarity and serum sodium as indicated or ordered  - Monitor response to interventions for patient's volume status, including labs, urine output, blood pressure (other measures as available)  - Encourage oral intake as appropriate  - Instruct patient on fluid and nutrition restrictions as appropriate  8/4/2023 1102 by Rohan Bhatti RN  Outcome: Progressing  8/4/2023 1100 by Rohan Bhatti RN  Outcome: Progressing

## 2023-08-04 NOTE — PHYSICAL THERAPY NOTE
PT order received. Chart reviewed. OT had discussion with son about PLOF. States 95% of the time he is in the wheelchair. He is a one person stand pivot with RN staff at [facility]. At this time PT will sign off as pt has no IP PT needs. RN aware.

## 2023-08-04 NOTE — PLAN OF CARE
Assumed care at 2030. Pt transferred from [facility] for SVT episode, vitals currently stable. Pt is alert to self, mandarin speaking, [place]. Pt is on RA, O2 sats WNL. SB on tele, VSS. Incontinent of B&, primofit in place. No pain at this time. Total lift, max assist, wheelchair bound baseline, tolerating well. Plan of care reviewed with patient, needs reinforcement, all needs addressed at this time, pt seems to be resting comfortably. Call light within reach. POC: monitor hgb Q12, IV fluids, monitor HR/rhythm    Problem: CARDIOVASCULAR - ADULT  Goal: Maintains optimal cardiac output and hemodynamic stability  Description: INTERVENTIONS:  - Monitor vital signs, rhythm, and trends  - Monitor for bleeding, hypotension and signs of decreased cardiac output  - Evaluate effectiveness of vasoactive medications to optimize hemodynamic stability  - Monitor arterial and/or venous puncture sites for bleeding and/or hematoma  - Assess quality of pulses, skin color and temperature  - Assess for signs of decreased coronary artery perfusion - ex.  Angina  - Evaluate fluid balance, assess for edema, trend weights  Outcome: Progressing  Goal: Absence of cardiac arrhythmias or at baseline  Description: INTERVENTIONS:  - Continuous cardiac monitoring, monitor vital signs, obtain 12 lead EKG if indicated  - Evaluate effectiveness of antiarrhythmic and heart rate control medications as ordered  - Initiate emergency measures for life threatening arrhythmias  - Monitor electrolytes and administer replacement therapy as ordered  Outcome: Progressing     Problem: GASTROINTESTINAL - ADULT  Goal: Maintains or returns to baseline bowel function  Description: INTERVENTIONS:  - Assess bowel function  - Maintain adequate hydration with IV or PO as ordered and tolerated  - Evaluate effectiveness of GI medications  - Encourage mobilization and activity  - Obtain nutritional consult as needed  - Establish a toileting routine/schedule  - Consider collaborating with pharmacy to review patient's medication profile  Outcome: Progressing  Goal: Maintains adequate nutritional intake (undernourished)  Description: INTERVENTIONS:  - Monitor percentage of each meal consumed  - Identify factors contributing to decreased intake, treat as appropriate  - Assist with meals as needed  - Monitor I&O, WT and lab values  - Obtain nutritional consult as needed  - Optimize oral hygiene and moisture  - Encourage food from home; allow for food preferences  - Enhance eating environment  Outcome: Progressing     Problem: METABOLIC/FLUID AND ELECTROLYTES - ADULT  Goal: Glucose maintained within prescribed range  Description: INTERVENTIONS:  - Monitor Blood Glucose as ordered  - Assess for signs and symptoms of hyperglycemia and hypoglycemia  - Administer ordered medications to maintain glucose within target range  - Assess barriers to adequate nutritional intake and initiate nutrition consult as needed  - Instruct patient on self management of diabetes  Outcome: Progressing  Goal: Electrolytes maintained within normal limits  Description: INTERVENTIONS:  - Monitor labs and rhythm and assess patient for signs and symptoms of electrolyte imbalances  - Administer electrolyte replacement as ordered  - Monitor response to electrolyte replacements, including rhythm and repeat lab results as appropriate  - Fluid restriction as ordered  - Instruct patient on fluid and nutrition restrictions as appropriate  Outcome: Progressing  Goal: Hemodynamic stability and optimal renal function maintained  Description: INTERVENTIONS:  - Monitor labs and assess for signs and symptoms of volume excess or deficit  - Monitor intake, output and patient weight  - Monitor urine specific gravity, serum osmolarity and serum sodium as indicated or ordered  - Monitor response to interventions for patient's volume status, including labs, urine output, blood pressure (other measures as available)  - Encourage oral intake as appropriate  - Instruct patient on fluid and nutrition restrictions as appropriate  Outcome: Progressing

## 2023-08-04 NOTE — PROGRESS NOTES
[facility] Pharmacy Note: Route Optimization for Pantoprazole (Protonix)    Patient is currently on Pantoprazole (Protonix) 40 mg IV every 24 hours. The patient meets the criteria to convert to the oral equivalent as established by the IV to Oral conversion protocol approved by the P&T committee. Medication was changed from IV formulation to lansoprazole (Prevacid)  30 mg PO every 24 hours per protocol.       Lashae Egan, [address] [place] [address]  8/4/2023,  5:21 PM

## 2023-08-05 VITALS
DIASTOLIC BLOOD PRESSURE: 68 MMHG | WEIGHT: 120 LBS | RESPIRATION RATE: 18 BRPM | TEMPERATURE: 97 F | OXYGEN SATURATION: 96 % | SYSTOLIC BLOOD PRESSURE: 158 MMHG | HEART RATE: 63 BPM | BODY MASS INDEX: 19 KG/M2

## 2023-08-05 LAB
ANION GAP SERPL CALC-SCNC: 3 MMOL/L (ref 0–18)
BASOPHILS # BLD AUTO: 0.08 X10(3) UL (ref 0–0.2)
BASOPHILS NFR BLD AUTO: 1 %
BUN BLD-MCNC: 12 MG/DL (ref 7–18)
CALCIUM BLD-MCNC: 8.9 MG/DL (ref 8.5–10.1)
CHLORIDE SERPL-SCNC: 99 MMOL/L (ref 98–112)
CO2 SERPL-SCNC: 31 MMOL/L (ref 21–32)
CREAT BLD-MCNC: 1.02 MG/DL
EGFRCR SERPLBLD CKD-EPI 2021: 70 ML/MIN/1.73M2 (ref 60–?)
EOSINOPHIL # BLD AUTO: 0.44 X10(3) UL (ref 0–0.7)
EOSINOPHIL NFR BLD AUTO: 5.4 %
ERYTHROCYTE [DISTWIDTH] IN BLOOD BY AUTOMATED COUNT: 12.3 %
GLUCOSE BLD-MCNC: 108 MG/DL (ref 70–99)
GLUCOSE BLD-MCNC: 195 MG/DL (ref 70–99)
GLUCOSE BLD-MCNC: 209 MG/DL (ref 70–99)
HCT VFR BLD AUTO: 34.6 %
HGB BLD-MCNC: 11.4 G/DL
HGB BLD-MCNC: 11.4 G/DL
IMM GRANULOCYTES # BLD AUTO: 0.02 X10(3) UL (ref 0–1)
IMM GRANULOCYTES NFR BLD: 0.2 %
LYMPHOCYTES # BLD AUTO: 3.11 X10(3) UL (ref 1–4)
LYMPHOCYTES NFR BLD AUTO: 38.4 %
MCH RBC QN AUTO: 31.7 PG (ref 26–34)
MCHC RBC AUTO-ENTMCNC: 32.9 G/DL (ref 31–37)
MCV RBC AUTO: 96.1 FL
MONOCYTES # BLD AUTO: 0.51 X10(3) UL (ref 0.1–1)
MONOCYTES NFR BLD AUTO: 6.3 %
NEUTROPHILS # BLD AUTO: 3.94 X10 (3) UL (ref 1.5–7.7)
NEUTROPHILS # BLD AUTO: 3.94 X10(3) UL (ref 1.5–7.7)
NEUTROPHILS NFR BLD AUTO: 48.7 %
OSMOLALITY SERPL CALC.SUM OF ELEC: 282 MOSM/KG (ref 275–295)
PLATELET # BLD AUTO: 194 10(3)UL (ref 150–450)
POTASSIUM SERPL-SCNC: 3.6 MMOL/L (ref 3.5–5.1)
POTASSIUM SERPL-SCNC: 3.9 MMOL/L (ref 3.5–5.1)
RBC # BLD AUTO: 3.6 X10(6)UL
SODIUM SERPL-SCNC: 133 MMOL/L (ref 136–145)
WBC # BLD AUTO: 8.1 X10(3) UL (ref 4–11)

## 2023-08-05 PROCEDURE — 80048 BASIC METABOLIC PNL TOTAL CA: CPT | Performed by: HOSPITALIST

## 2023-08-05 PROCEDURE — 85025 COMPLETE CBC W/AUTO DIFF WBC: CPT | Performed by: HOSPITALIST

## 2023-08-05 PROCEDURE — 84132 ASSAY OF SERUM POTASSIUM: CPT | Performed by: HOSPITALIST

## 2023-08-05 PROCEDURE — 85018 HEMOGLOBIN: CPT | Performed by: INTERNAL MEDICINE

## 2023-08-05 PROCEDURE — 82962 GLUCOSE BLOOD TEST: CPT

## 2023-08-05 RX ORDER — DILTIAZEM HYDROCHLORIDE 120 MG/1
120 CAPSULE, EXTENDED RELEASE ORAL DAILY
Status: DISCONTINUED | OUTPATIENT
Start: 2023-08-06 | End: 2023-08-05

## 2023-08-05 RX ORDER — DILTIAZEM HYDROCHLORIDE 120 MG/1
120 CAPSULE, COATED, EXTENDED RELEASE ORAL DAILY
Qty: 30 CAPSULE | Refills: 0 | Status: SHIPPED | OUTPATIENT
Start: 2023-08-06

## 2023-08-05 NOTE — DISCHARGE SUMMARY
[facility] Discharge Summary     Patient ID:  Alice Hale  80year old  8/15/1933    Admit date: 8/2/2023    Discharge date and time: 8/5/2023    Attending Physician: Mayra Chase MD     Primary Care Physician: Shahnaz Krueger MD     Discharge Condition: stable    Disposition: SNF    Primary Diagnosis at discharge from Hospital: rectal bleeding    Please note that only IHP DMG and EMG patients enrolled in the Medicare ACO, [facility] ACO and [address] [place] [address] will be handled by the [address] [place], [place] [address]S [facility] Management team.  For all other patients, please follow usual protocol for discharge care transition. Secondary Diagnoses: See chart    Risk of readmission: Nura Becerra has Moderate Risk of readmission after discharge from the hospital.    Important Follow up: Peng Sr MD  [address]  [phone]    Schedule an appointment as soon as possible for a visit in 1 week(s)        No future appointments. Hospital Course:      80year old male with PMH sig for Alzheimer dementia, chronic anemia, DM2, HTN, BPH presenting from [facility]) for evaluation of multiple episodes of rectal bleeding. Bleeding subsided with only a couple smears of bright red blood noted. GI consulted - conservative management in setting of advanced age, suspect  bleeding was related to h/o hemorrhoids. During admission pt had several episodes of unstable SVT on 8/3. RRT called - s/p adenosine x 2. Converted to SR. Has since been in sinus bradycardia. Cardiology following - rates reviewed, started on cardizem 120mg     # rectal bleeding  - hgb remains stable   - GI following -> likely hemorrhoidal. No scopes at this time. - cont docusate-senna     # SVT/hypotension episode 8/3  - s/p adenosine X 2  - has been sinus kevin today. - cards following.  - echo with nl EF, mild-mod regurg MV, AV, TR  - started on norvasc --> switched to cardizem on discharge for SVT suppression. I d/w Dr. Faby Andrews. Tele reviewed. # dm2  - SSI with VARSHA ben     # dementia  - cont namenda    Consults: IP CONSULT TO HOSPITALIST  IP CONSULT TO GASTROENTEROLOGY  IP CONSULT TO FOOD AND NUTRITION SERVICES  IP CONSULT TO CARDIOLOGY    Radiology reports:    CARD ECHO 2D DOPPLER (CPT=93306)    Result Date: 8/3/2023  Transthoracic Echocardiogram Name:Redd Charles Date: 2023 :  08/15/1933 Ht:  (62in)  BP: 134 / 50 MRN:  [id number]    Age:  80years    Wt:  (120lb) HR: 50bpm Loc:  [facility]       Gndr: M          BSA: 1.54m^2 Sonographer: Bishop PISANO Ordering:    Dunning, Keneth Runner Consulting:  Jon Bonilla ---------------------------------------------------------------------------- History/Indications:   Hypotension. ---------------------------------------------------------------------------- Procedure information:  A transthoracic complete 2D study was performed. Additional evaluation included M-mode, complete spectral Doppler, and color Doppler. Patient status:  Inpatient. Location:  Bedside. No prior study was available for comparison. This was a STAT study. Transthoracic echocardiography for ventricular function evaluation and assessment of valvular function. Image quality was adequate. ECG rhythm:   Normal sinus ---------------------------------------------------------------------------- Conclusions: 1. Left ventricle: The cavity size was normal. Wall thickness was mildly    increased. Systolic function was normal. The estimated ejection fraction    was 55-60%. 2. Left atrium: The atrium was mildly dilated. The left atrial volume was    mildly increased. 3. Right atrium: The atrium was mildly dilated. 4. Aortic valve: There was moderate regurgitation. 5. Aortic root: The aortic root was at the upper limits of normal and 3.8cm    diameter. 6. Mitral valve: The annulus was moderately calcified. The leaflets were    moderately calcified. There was mild to moderate regurgitation. 7. Tricuspid valve:  There was mild-moderate regurgitation. Impressions:  No previous study was available for comparison. * ---------------------------------------------------------------------------- * Findings: Left ventricle: The cavity size was normal. Wall thickness was mildly increased. Systolic function was normal. The estimated ejection fraction was 55-60%. Left atrium:  The atrium was mildly dilated. The left atrial volume was mildly increased. Right ventricle: The cavity size was normal. Systolic function was normal. Right atrium:  The atrium was mildly dilated. Mitral valve: The annulus was moderately calcified. The leaflets were moderately calcified. Doppler: There was mild to moderate regurgitation. Aortic valve: The valve was trileaflet. The leaflets were mildly calcified. Cusp separation was normal.  Doppler:  Transvalvular velocity was within the normal range. There was no evidence for stenosis. There was moderate regurgitation. Tricuspid valve: The valve is structurally normal. Leaflet separation was normal.  Doppler:  Transvalvular velocity was within the normal range. There was no evidence for stenosis. There was mild-moderate regurgitation. Pulmonic valve: The valve is structurally normal. Cusp separation was normal.  Doppler:  Transvalvular velocity was within the normal range. There was no evidence for stenosis. There was no significant regurgitation. Pericardium:   There was no pericardial effusion. Aorta: Aortic root: The aortic root was at the upper limits of normal and 3.8cm diameter. Systemic veins:  Central venous respirophasic diameter changes are in the normal range (>50%). Inferior vena cava:  The IVC was normal-sized. ---------------------------------------------------------------------------- Measurements  Left ventricle                    Value        Ref  IVS thickness, ED, PLAX       (H) 1.1   cm     0.6 -                                                 1.0  LV ID, ED, PLAX                   4.7   cm     4.2 - 5.8  LV ID, ES, PLAX                   3.3   cm     2.5 -                                                 4.0  LV PW thickness, ED, PLAX     (H) 1.1   cm     0.6 -                                                 1.0  IVS/LV PW ratio, ED, PLAX         0.95         --------  LV PW/LV ID ratio, ED, PLAX       0.24         --------  LV ejection fraction              55    %      52 - 72  Aortic root                       Value        Ref  Aortic root ID, ED                3.8   cm     2.3 -                                                 3.8  Left atrium                       Value        Ref  LA ID, A-P, ES                    3.1   cm     3.0 -                                                 4.0  LA volume, S                      54    ml     18 - 58  LA volume/bsa, S              (H) 35    ml/m^2 16 - 34  LA volume, ES, 1-p A4C        (H) 63    ml     18 - 58  LA volume, ES, 1-p A2C            44    ml     18 - 58  LA volume, ES, A/L                62    ml     --------  LA volume/bsa, ES, A/L        (H) 40    ml/m^2 16 - 34  LA/aortic root ratio              0.82         --------  Pulmonary artery                  Value        Ref  PA pressure, S, DP                28    mm Hg  --------  Tricuspid valve                   Value        Ref  Tricuspid regurg peak             2.38  m/sec  <=2.8  velocity  Tricuspid peak RV-RA gradient     23    mm Hg  --------  Systemic veins                    Value        Ref  Estimated CVP                     5     mm Hg  --------  Right ventricle                   Value        Ref  RV pressure, S, DP                28    mm Hg  -------- Legend: (L)  and  (H)  francine values outside specified reference range. ---------------------------------------------------------------------------- Prepared and electronically signed by Fitz Snowden MD 08/03/2023 19:54        Operative Procedures:        Patient instructions:      Current Discharge Medication List    START taking these medications    dilTIAZem  MG Oral Capsule SR 24 Hr  Take 1 capsule (120 mg total) by mouth daily. Per cardiology      CONTINUE these medications which have NOT CHANGED    sertraline 25 MG Oral Tab  Take 1 tablet (25 mg total) by mouth daily. acetaminophen 650 MG Rectal Suppos  Place 1 suppository (650 mg total) rectally every 6 (six) hours as needed for Fever or Pain. senna-docusate 8.6-50 MG Oral Tab  Take 1 tablet by mouth 2 (two) times daily. Memantine HCl ER 21 MG Oral Capsule SR 24 Hr  Take 21 mg by mouth daily. metFORMIN HCl 500 MG Oral Tab  Take 2 tablets (1,000 mg total) by mouth 2 (two) times daily. Code Status: DNAR/Selective Treatment      Exam on day of discharge:      08/05/23  1300   BP: 149/66   Pulse: 57   Resp:    Temp:        Alert oriented. See progress and nursing notes for further details. Total time for evaluation, record review, and coordinating care for discharge: approximately 35 minutes spent throughout day of discharge.      I PERSONALLY RECONCILED CURRENT AND DISCHARGE MEDICATIONS ON DAY OF DISCHARGE    Altaf Mancera MD  [address] [place] [address], Hospitalist

## 2023-08-05 NOTE — CM/SW NOTE
08/05/23 1322   Discharge disposition   Expected discharge disposition [facility] Provider Antoni 25 Na   Discharge transportation [facility]     Pt cleared to return to [facility] today. BLS set up for 5:00pm. PCS form completed. Message sent to [facility] in [address] of return today. RN updated, will inform family of dc time.      RN to call report at dc: Mickey Henriquez RUST 15.: [facility]  Discharge Planner  [id number]

## 2023-08-05 NOTE — PLAN OF CARE
Assumed care at [address]. Pt is alert to self, mandarin speaking, [place]. Pt is on RA, O2 sats WNL. SB on tele, VSS. Incontinent of B&, primofit in place. No pain at this time. Total lift, max assist, wheelchair bound baseline, tolerating well. Plan of care reviewed with patient, needs reinforcement, all needs addressed at this time, pt seems to be resting comfortably. Call light within reach. POC: monitor hgb, monitor HR/rhythm          Problem: CARDIOVASCULAR - ADULT  Goal: Maintains optimal cardiac output and hemodynamic stability  Description: INTERVENTIONS:  - Monitor vital signs, rhythm, and trends  - Monitor for bleeding, hypotension and signs of decreased cardiac output  - Evaluate effectiveness of vasoactive medications to optimize hemodynamic stability  - Monitor arterial and/or venous puncture sites for bleeding and/or hematoma  - Assess quality of pulses, skin color and temperature  - Assess for signs of decreased coronary artery perfusion - ex.  Angina  - Evaluate fluid balance, assess for edema, trend weights  Outcome: Progressing  Goal: Absence of cardiac arrhythmias or at baseline  Description: INTERVENTIONS:  - Continuous cardiac monitoring, monitor vital signs, obtain 12 lead EKG if indicated  - Evaluate effectiveness of antiarrhythmic and heart rate control medications as ordered  - Initiate emergency measures for life threatening arrhythmias  - Monitor electrolytes and administer replacement therapy as ordered  Outcome: Progressing     Problem: GASTROINTESTINAL - ADULT  Goal: Maintains or returns to baseline bowel function  Description: INTERVENTIONS:  - Assess bowel function  - Maintain adequate hydration with IV or PO as ordered and tolerated  - Evaluate effectiveness of GI medications  - Encourage mobilization and activity  - Obtain nutritional consult as needed  - Establish a toileting routine/schedule  - Consider collaborating with pharmacy to review patient's medication profile  Outcome: Progressing  Goal: Maintains adequate nutritional intake (undernourished)  Description: INTERVENTIONS:  - Monitor percentage of each meal consumed  - Identify factors contributing to decreased intake, treat as appropriate  - Assist with meals as needed  - Monitor I&O, WT and lab values  - Obtain nutritional consult as needed  - Optimize oral hygiene and moisture  - Encourage food from home; allow for food preferences  - Enhance eating environment  Outcome: Progressing     Problem: METABOLIC/FLUID AND ELECTROLYTES - ADULT  Goal: Glucose maintained within prescribed range  Description: INTERVENTIONS:  - Monitor Blood Glucose as ordered  - Assess for signs and symptoms of hyperglycemia and hypoglycemia  - Administer ordered medications to maintain glucose within target range  - Assess barriers to adequate nutritional intake and initiate nutrition consult as needed  - Instruct patient on self management of diabetes  Outcome: Progressing  Goal: Electrolytes maintained within normal limits  Description: INTERVENTIONS:  - Monitor labs and rhythm and assess patient for signs and symptoms of electrolyte imbalances  - Administer electrolyte replacement as ordered  - Monitor response to electrolyte replacements, including rhythm and repeat lab results as appropriate  - Fluid restriction as ordered  - Instruct patient on fluid and nutrition restrictions as appropriate  Outcome: Progressing  Goal: Hemodynamic stability and optimal renal function maintained  Description: INTERVENTIONS:  - Monitor labs and assess for signs and symptoms of volume excess or deficit  - Monitor intake, output and patient weight  - Monitor urine specific gravity, serum osmolarity and serum sodium as indicated or ordered  - Monitor response to interventions for patient's volume status, including labs, urine output, blood pressure (other measures as available)  - Encourage oral intake as appropriate  - Instruct patient on fluid and nutrition restrictions as appropriate  Outcome: Progressing

## 2023-08-05 NOTE — PROGRESS NOTES
Gave report to [facility]. Explained discharge instructions including medications and follow-ups to the pt's son. Rx and copy of POLST given. Verbalized understanding, IV removed, tele monitor discontinued. Will be transported via ambulance.

## 2023-08-05 NOTE — PLAN OF CARE
Received pt at 0730. Alert to self, Mandarin speaking. Room air. Denies pain/SOB. Vitals stable. SB w rates in 46s on tele. See flowsheets for additional assessments. Plan to monitor Hgb, 11.4 this am. Plan of care updated with pt and family, questions answered, verbalized understanding. Safety precautions in place. Instructed to call staff for help. Will continue to monitor. Problem: CARDIOVASCULAR - ADULT  Goal: Maintains optimal cardiac output and hemodynamic stability  Description: INTERVENTIONS:  - Monitor vital signs, rhythm, and trends  - Monitor for bleeding, hypotension and signs of decreased cardiac output  - Evaluate effectiveness of vasoactive medications to optimize hemodynamic stability  - Monitor arterial and/or venous puncture sites for bleeding and/or hematoma  - Assess quality of pulses, skin color and temperature  - Assess for signs of decreased coronary artery perfusion - ex.  Angina  - Evaluate fluid balance, assess for edema, trend weights  Outcome: Progressing  Goal: Absence of cardiac arrhythmias or at baseline  Description: INTERVENTIONS:  - Continuous cardiac monitoring, monitor vital signs, obtain 12 lead EKG if indicated  - Evaluate effectiveness of antiarrhythmic and heart rate control medications as ordered  - Initiate emergency measures for life threatening arrhythmias  - Monitor electrolytes and administer replacement therapy as ordered  Outcome: Progressing     Problem: GASTROINTESTINAL - ADULT  Goal: Maintains or returns to baseline bowel function  Description: INTERVENTIONS:  - Assess bowel function  - Maintain adequate hydration with IV or PO as ordered and tolerated  - Evaluate effectiveness of GI medications  - Encourage mobilization and activity  - Obtain nutritional consult as needed  - Establish a toileting routine/schedule  - Consider collaborating with pharmacy to review patient's medication profile  Outcome: Progressing  Goal: Maintains adequate nutritional intake (undernourished)  Description: INTERVENTIONS:  - Monitor percentage of each meal consumed  - Identify factors contributing to decreased intake, treat as appropriate  - Assist with meals as needed  - Monitor I&O, WT and lab values  - Obtain nutritional consult as needed  - Optimize oral hygiene and moisture  - Encourage food from home; allow for food preferences  - Enhance eating environment  Outcome: Progressing     Problem: METABOLIC/FLUID AND ELECTROLYTES - ADULT  Goal: Glucose maintained within prescribed range  Description: INTERVENTIONS:  - Monitor Blood Glucose as ordered  - Assess for signs and symptoms of hyperglycemia and hypoglycemia  - Administer ordered medications to maintain glucose within target range  - Assess barriers to adequate nutritional intake and initiate nutrition consult as needed  - Instruct patient on self management of diabetes  Outcome: Progressing  Goal: Electrolytes maintained within normal limits  Description: INTERVENTIONS:  - Monitor labs and rhythm and assess patient for signs and symptoms of electrolyte imbalances  - Administer electrolyte replacement as ordered  - Monitor response to electrolyte replacements, including rhythm and repeat lab results as appropriate  - Fluid restriction as ordered  - Instruct patient on fluid and nutrition restrictions as appropriate  Outcome: Progressing  Goal: Hemodynamic stability and optimal renal function maintained  Description: INTERVENTIONS:  - Monitor labs and assess for signs and symptoms of volume excess or deficit  - Monitor intake, output and patient weight  - Monitor urine specific gravity, serum osmolarity and serum sodium as indicated or ordered  - Monitor response to interventions for patient's volume status, including labs, urine output, blood pressure (other measures as available)  - Encourage oral intake as appropriate  - Instruct patient on fluid and nutrition restrictions as appropriate  Outcome: Progressing     Problem: Patient/Family Goals  Goal: Patient/Family Long Term Goal  Description: Patient's Long Term Goal: go home    Interventions:  - monitor Hgb  - See additional Care Plan goals for specific interventions  Outcome: Progressing  Goal: Patient/Family Short Term Goal  Description: Patient's Short Term Goal: feel better    Interventions:   - monitor Hgb  - See additional Care Plan goals for specific interventions  Outcome: Progressing

## 2023-08-05 NOTE — PROGRESS NOTES
[facility] Discharge Summary     Patient ID:  Ash Faye  80year old  8/15/1933    Admit date: 8/2/2023    Discharge date and time: 8/5/2023    Attending Physician: Milana Mcqueen MD     Primary Care Physician: Gordy Chaudhari MD     Discharge Condition: stable    Disposition: SNF    Primary Diagnosis at discharge from Hospital: rectal bleeding    Please note that only IHP DMG and EMG patients enrolled in the Medicare ACO, [facility] ACO and [address] [place] [address] will be handled by the [address] [place], [place] [address]S [facility] Management team.  For all other patients, please follow usual protocol for discharge care transition. Secondary Diagnoses: See chart    Risk of readmission: Nura Claros has Moderate Risk of readmission after discharge from the hospital.    Important Follow up: Viet Morillo MD  [facility]  [phone]    Schedule an appointment as soon as possible for a visit in 1 week(s)        No future appointments. Hospital Course:      80year old male with PMH sig for Alzheimer dementia, chronic anemia, DM2, HTN, BPH presenting from [facility] for evaluation of multiple episodes of rectal bleeding. Bleeding subsided with only a couple smears of bright red blood noted. GI consulted - conservative management in setting of advanced age, suspect  bleeding was related to h/o hemorrhoids. During admission pt had several episodes of unstable SVT on 8/3. RRT called - s/p adenosine x 2. Converted to SR. Has since been in sinus bradycardia. Cardiology following - rates reviewed, started on cardizem 120mg     # rectal bleeding  - hgb remains stable   - GI following -> likely hemorrhoidal. No scopes at this time. - cont docusate-senna     # SVT/hypotension episode 8/3  - s/p adenosine X 2  - has been sinus kevin today. - cards following.  - echo with nl EF, mild-mod regurg MV, AV, TR  - started on norvasc --> switched to cardizem on discharge for SVT suppression. I d/w Dr. Katherin Vásquez. Tele reviewed. # dm2  - SSI with GERTRUDED letiluan     # dementia  - cont namenda    Consults: IP CONSULT TO HOSPITALIST  IP CONSULT TO GASTROENTEROLOGY  IP CONSULT TO FOOD AND NUTRITION SERVICES  IP CONSULT TO CARDIOLOGY    Radiology reports:    CARD ECHO 2D DOPPLER (CPT=93306)    Result Date: 8/3/2023  Transthoracic Echocardiogram Name:Yamilex Charles Date: 2023 :  08/15/1933 Ht:  (62in)  BP: 134 / 50 MRN:  [id number]    Age:  80years    Wt:  (120lb) HR: 50bpm Loc:  EDWP       Gndr: M          BSA: 1.54m^2 Sonographer: Bishop PISANO Ordering:    Miranda Lewis Consulting:  Reynaldo Dubon ---------------------------------------------------------------------------- History/Indications:   Hypotension. ---------------------------------------------------------------------------- Procedure information:  A transthoracic complete 2D study was performed. Additional evaluation included M-mode, complete spectral Doppler, and color Doppler. Patient status:  Inpatient. Location:  Bedside. No prior study was available for comparison. This was a STAT study. Transthoracic echocardiography for ventricular function evaluation and assessment of valvular function. Image quality was adequate. ECG rhythm:   Normal sinus ---------------------------------------------------------------------------- Conclusions: 1. Left ventricle: The cavity size was normal. Wall thickness was mildly    increased. Systolic function was normal. The estimated ejection fraction    was 55-60%. 2. Left atrium: The atrium was mildly dilated. The left atrial volume was    mildly increased. 3. Right atrium: The atrium was mildly dilated. 4. Aortic valve: There was moderate regurgitation. 5. Aortic root: The aortic root was at the upper limits of normal and 3.8cm    diameter. 6. Mitral valve: The annulus was moderately calcified. The leaflets were    moderately calcified. There was mild to moderate regurgitation. 7. Tricuspid valve:  There was mild-moderate regurgitation. Impressions:  No previous study was available for comparison. * ---------------------------------------------------------------------------- * Findings: Left ventricle: The cavity size was normal. Wall thickness was mildly increased. Systolic function was normal. The estimated ejection fraction was 55-60%. Left atrium:  The atrium was mildly dilated. The left atrial volume was mildly increased. Right ventricle: The cavity size was normal. Systolic function was normal. Right atrium:  The atrium was mildly dilated. Mitral valve: The annulus was moderately calcified. The leaflets were moderately calcified. Doppler: There was mild to moderate regurgitation. Aortic valve: The valve was trileaflet. The leaflets were mildly calcified. Cusp separation was normal.  Doppler:  Transvalvular velocity was within the normal range. There was no evidence for stenosis. There was moderate regurgitation. Tricuspid valve: The valve is structurally normal. Leaflet separation was normal.  Doppler:  Transvalvular velocity was within the normal range. There was no evidence for stenosis. There was mild-moderate regurgitation. Pulmonic valve: The valve is structurally normal. Cusp separation was normal.  Doppler:  Transvalvular velocity was within the normal range. There was no evidence for stenosis. There was no significant regurgitation. Pericardium:   There was no pericardial effusion. Aorta: Aortic root: The aortic root was at the upper limits of normal and 3.8cm diameter. Systemic veins:  Central venous respirophasic diameter changes are in the normal range (>50%). Inferior vena cava:  The IVC was normal-sized. ---------------------------------------------------------------------------- Measurements  Left ventricle                    Value        Ref  IVS thickness, ED, PLAX       (H) 1.1   cm     0.6 -                                                 1.0  LV ID, ED, PLAX                   4.7   cm     4.2 - 5.8  LV ID, ES, PLAX                   3.3   cm     2.5 -                                                 4.0  LV PW thickness, ED, PLAX     (H) 1.1   cm     0.6 -                                                 1.0  IVS/LV PW ratio, ED, PLAX         0.95         --------  LV PW/LV ID ratio, ED, PLAX       0.24         --------  LV ejection fraction              55    %      52 - 72  Aortic root                       Value        Ref  Aortic root ID, ED                3.8   cm     2.3 -                                                 3.8  Left atrium                       Value        Ref  LA ID, A-P, ES                    3.1   cm     3.0 -                                                 4.0  LA volume, S                      54    ml     18 - 58  LA volume/bsa, S              (H) 35    ml/m^2 16 - 34  LA volume, ES, 1-p A4C        (H) 63    ml     18 - 58  LA volume, ES, 1-p A2C            44    ml     18 - 58  LA volume, ES, A/L                62    ml     --------  LA volume/bsa, ES, A/L        (H) 40    ml/m^2 16 - 34  LA/aortic root ratio              0.82         --------  Pulmonary artery                  Value        Ref  PA pressure, S, DP                28    mm Hg  --------  Tricuspid valve                   Value        Ref  Tricuspid regurg peak             2.38  m/sec  <=2.8  velocity  Tricuspid peak RV-RA gradient     23    mm Hg  --------  Systemic veins                    Value        Ref  Estimated CVP                     5     mm Hg  --------  Right ventricle                   Value        Ref  RV pressure, S, DP                28    mm Hg  -------- Legend: (L)  and  (H)  francine values outside specified reference range. ---------------------------------------------------------------------------- Prepared and electronically signed by Alice Islas MD 08/03/2023 19:54        Operative Procedures:        Patient instructions:      Current Discharge Medication List    START taking these medications    dilTIAZem  MG Oral Capsule SR 24 Hr  Take 1 capsule (120 mg total) by mouth daily. Per cardiology      CONTINUE these medications which have NOT CHANGED    sertraline 25 MG Oral Tab  Take 1 tablet (25 mg total) by mouth daily. acetaminophen 650 MG Rectal Suppos  Place 1 suppository (650 mg total) rectally every 6 (six) hours as needed for Fever or Pain. senna-docusate 8.6-50 MG Oral Tab  Take 1 tablet by mouth 2 (two) times daily. Memantine HCl ER 21 MG Oral Capsule SR 24 Hr  Take 21 mg by mouth daily. metFORMIN HCl 500 MG Oral Tab  Take 2 tablets (1,000 mg total) by mouth 2 (two) times daily. Code Status: DNAR/Selective Treatment      Exam on day of discharge:      08/05/23  1300   BP: 149/66   Pulse: 57   Resp:    Temp:        Alert oriented. See progress and nursing notes for further details. Total time for evaluation, record review, and coordinating care for discharge: approximately 35 minutes spent throughout day of discharge.      I PERSONALLY RECONCILED CURRENT AND DISCHARGE MEDICATIONS ON DAY OF DISCHARGE    Shanel Alejandro MD  [address] [place] [address], Hospitalist

## 2023-10-07 ENCOUNTER — APPOINTMENT (OUTPATIENT)
Dept: CT IMAGING | Facility: HOSPITAL | Age: 88
End: 2023-10-07

## 2023-10-07 ENCOUNTER — HOSPITAL ENCOUNTER (EMERGENCY)
Facility: HOSPITAL | Age: 88
Discharge: HOME | End: 2023-10-07
Attending: EMERGENCY MEDICINE

## 2023-10-07 VITALS
OXYGEN SATURATION: 98 % | TEMPERATURE: 98 F | BODY MASS INDEX: 19 KG/M2 | DIASTOLIC BLOOD PRESSURE: 79 MMHG | WEIGHT: 121.25 LBS | HEART RATE: 76 BPM | RESPIRATION RATE: 18 BRPM | SYSTOLIC BLOOD PRESSURE: 145 MMHG
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
## 2023-10-07 DIAGNOSIS — S00.83XA CONTUSION OF FACE, INITIAL ENCOUNTER: ICD-10-CM

## 2023-10-07 DIAGNOSIS — S01.81XA FACIAL LACERATION, INITIAL ENCOUNTER: ICD-10-CM
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
## 2023-10-07 PROCEDURE — 76377 3D RENDER W/INTRP POSTPROCES: CPT | Performed by: EMERGENCY MEDICINE

## 2023-10-07 PROCEDURE — 99285 EMERGENCY DEPT VISIT HI MDM: CPT

## 2023-10-07 PROCEDURE — 96374 THER/PROPH/DIAG INJ IV PUSH: CPT

## 2023-10-07 PROCEDURE — 70486 CT MAXILLOFACIAL W/O DYE: CPT | Performed by: EMERGENCY MEDICINE

## 2023-10-07 PROCEDURE — 70450 CT HEAD/BRAIN W/O DYE: CPT | Performed by: EMERGENCY MEDICINE

## 2023-10-07 PROCEDURE — 72125 CT NECK SPINE W/O DYE: CPT | Performed by: EMERGENCY MEDICINE

## 2023-10-07 RX ORDER — LORAZEPAM 2 MG/ML
1 INJECTION INTRAMUSCULAR ONCE
Status: COMPLETED | OUTPATIENT
Start: 2023-10-07 | End: 2023-10-07

## 2023-10-07 RX ORDER — LORAZEPAM 2 MG/ML
INJECTION INTRAMUSCULAR
Status: COMPLETED
Start: 2023-10-07 | End: 2023-10-07

## 2023-10-07 NOTE — ED QUICK NOTES
Pt unable to stay on CT Scan bed safely without attempting to get up. Called RN for medication. Brought patient back to room.

## 2023-10-07 NOTE — ED INITIAL ASSESSMENT (HPI)
Brought by medic from  [facility]  with history of fall they found him in the floor at 0645 h . Patient was awake when they found him . Last time they saw him was at 0600 in the break fast table. Patient is on c-collar there is laceration and swelling on right  forehead hematoma  around right eye.  Dementia   oriented x1  that is his base line  does not  answer questions

## 2023-11-23 ENCOUNTER — HOSPITAL ENCOUNTER (INPATIENT)
Facility: HOSPITAL | Age: 88
LOS: 15 days | Discharge: HOSPICE | End: 2023-12-08
Attending: EMERGENCY MEDICINE | Admitting: HOSPITALIST
Payer: MEDICARE

## 2023-11-23 ENCOUNTER — APPOINTMENT (OUTPATIENT)
Dept: GENERAL RADIOLOGY | Facility: HOSPITAL | Age: 88
End: 2023-11-23
Attending: EMERGENCY MEDICINE
Payer: MEDICARE

## 2023-11-23 DIAGNOSIS — E87.0 HYPERNATREMIA: ICD-10-CM

## 2023-11-23 DIAGNOSIS — N17.9 ACUTE KIDNEY INJURY (HCC): ICD-10-CM

## 2023-11-23 DIAGNOSIS — E87.6 HYPOKALEMIA: ICD-10-CM

## 2023-11-23 DIAGNOSIS — R73.9 HYPERGLYCEMIA: ICD-10-CM

## 2023-11-23 DIAGNOSIS — I21.9 ACUTE MYOCARDIAL INFARCTION, UNSPECIFIED MI TYPE, UNSPECIFIED ARTERY (HCC): Primary | ICD-10-CM

## 2023-11-23 PROBLEM — D72.829 LEUKOCYTOSIS: Status: ACTIVE | Noted: 2023-11-23

## 2023-11-23 PROBLEM — R79.89 AZOTEMIA: Status: ACTIVE | Noted: 2023-11-23

## 2023-11-23 LAB
ALBUMIN SERPL-MCNC: 2.8 G/DL (ref 3.4–5)
ALBUMIN/GLOB SERPL: 0.5 {RATIO} (ref 1–2)
ALP LIVER SERPL-CCNC: 144 U/L
ALT SERPL-CCNC: 30 U/L
ANION GAP SERPL CALC-SCNC: 10 MMOL/L (ref 0–18)
ANION GAP SERPL CALC-SCNC: 11 MMOL/L (ref 0–18)
ARTERIAL PATENCY WRIST A: POSITIVE
AST SERPL-CCNC: 15 U/L (ref 15–37)
BASE EXCESS BLDA CALC-SCNC: -0.7 MMOL/L (ref ?–2)
BASOPHILS # BLD AUTO: 0.04 X10(3) UL (ref 0–0.2)
BASOPHILS NFR BLD AUTO: 0.2 %
BILIRUB SERPL-MCNC: 0.6 MG/DL (ref 0.1–2)
BODY TEMPERATURE: 98.6 F
BUN BLD-MCNC: 102 MG/DL (ref 9–23)
BUN BLD-MCNC: 93 MG/DL (ref 9–23)
CA-I BLD-SCNC: 1.23 MMOL/L (ref 0.95–1.32)
CALCIUM BLD-MCNC: 7.8 MG/DL (ref 8.5–10.1)
CALCIUM BLD-MCNC: 9.6 MG/DL (ref 8.5–10.1)
CHLORIDE SERPL-SCNC: 118 MMOL/L (ref 98–112)
CHLORIDE SERPL-SCNC: 128 MMOL/L (ref 98–112)
CHOLEST SERPL-MCNC: 230 MG/DL (ref ?–200)
CO2 SERPL-SCNC: 22 MMOL/L (ref 21–32)
CO2 SERPL-SCNC: 27 MMOL/L (ref 21–32)
COHGB MFR BLD: 1.8 % SAT (ref 0–3)
CREAT BLD-MCNC: 3.01 MG/DL
CREAT BLD-MCNC: 3.41 MG/DL
EGFRCR SERPLBLD CKD-EPI 2021: 16 ML/MIN/1.73M2 (ref 60–?)
EGFRCR SERPLBLD CKD-EPI 2021: 19 ML/MIN/1.73M2 (ref 60–?)
EOSINOPHIL # BLD AUTO: 0 X10(3) UL (ref 0–0.7)
EOSINOPHIL NFR BLD AUTO: 0 %
ERYTHROCYTE [DISTWIDTH] IN BLOOD BY AUTOMATED COUNT: 13.7 %
EST. AVERAGE GLUCOSE BLD GHB EST-MCNC: 157 MG/DL (ref 68–126)
GLOBULIN PLAS-MCNC: 5.1 G/DL (ref 2.8–4.4)
GLUCOSE BLD-MCNC: 163 MG/DL (ref 70–99)
GLUCOSE BLD-MCNC: 344 MG/DL (ref 70–99)
GLUCOSE BLD-MCNC: 404 MG/DL (ref 70–99)
GLUCOSE BLD-MCNC: 624 MG/DL (ref 70–99)
GLUCOSE BLD-MCNC: 779 MG/DL (ref 70–99)
HBA1C MFR BLD: 7.1 % (ref ?–5.7)
HCO3 BLDA-SCNC: 24.4 MEQ/L (ref 21–27)
HCT VFR BLD AUTO: 39.3 %
HDLC SERPL-MCNC: 15 MG/DL (ref 40–59)
HGB BLD-MCNC: 11.5 G/DL
HGB BLD-MCNC: 12.3 G/DL
IMM GRANULOCYTES # BLD AUTO: 0.07 X10(3) UL (ref 0–1)
IMM GRANULOCYTES NFR BLD: 0.4 %
L/M: 3 L/MIN
LACTATE BLD-SCNC: 6.1 MMOL/L (ref 0.5–2)
LDLC SERPL CALC-MCNC: 156 MG/DL (ref ?–100)
LYMPHOCYTES # BLD AUTO: 0.62 X10(3) UL (ref 1–4)
LYMPHOCYTES NFR BLD AUTO: 3.7 %
MCH RBC QN AUTO: 31.2 PG (ref 26–34)
MCHC RBC AUTO-ENTMCNC: 31.3 G/DL (ref 31–37)
MCV RBC AUTO: 99.7 FL
METHGB MFR BLD: 0 % SAT (ref 0.4–1.5)
MONOCYTES # BLD AUTO: 0.34 X10(3) UL (ref 0.1–1)
MONOCYTES NFR BLD AUTO: 2 %
MRSA DNA SPEC QL NAA+PROBE: NEGATIVE
NEUTROPHILS # BLD AUTO: 15.53 X10 (3) UL (ref 1.5–7.7)
NEUTROPHILS # BLD AUTO: 15.53 X10(3) UL (ref 1.5–7.7)
NEUTROPHILS NFR BLD AUTO: 93.7 %
NONHDLC SERPL-MCNC: 215 MG/DL (ref ?–130)
OSMOLALITY SERPL CALC.SUM OF ELEC: 388 MOSM/KG (ref 275–295)
OSMOLALITY SERPL CALC.SUM OF ELEC: 392 MOSM/KG (ref 275–295)
OXYHGB MFR BLDA: 97.1 % (ref 92–100)
PCO2 BLDA: 35 MM HG (ref 35–45)
PH BLDA: 7.43 [PH] (ref 7.35–7.45)
PLATELET # BLD AUTO: 173 10(3)UL (ref 150–450)
PO2 BLDA: 82 MM HG (ref 80–100)
POTASSIUM BLD-SCNC: 3.2 MMOL/L (ref 3.6–5.1)
POTASSIUM SERPL-SCNC: 2.5 MMOL/L (ref 3.5–5.1)
POTASSIUM SERPL-SCNC: 3.8 MMOL/L (ref 3.5–5.1)
PROT SERPL-MCNC: 7.9 G/DL (ref 6.4–8.2)
RBC # BLD AUTO: 3.94 X10(6)UL
SARS-COV-2 RNA RESP QL NAA+PROBE: NOT DETECTED
SODIUM BLD-SCNC: 164 MMOL/L (ref 135–145)
SODIUM SERPL-SCNC: 156 MMOL/L (ref 136–145)
SODIUM SERPL-SCNC: 160 MMOL/L (ref 136–145)
TRIGL SERPL-MCNC: 312 MG/DL (ref 30–149)
TROPONIN I SERPL HS-MCNC: 195 NG/L
VLDLC SERPL CALC-MCNC: 62 MG/DL (ref 0–30)
WBC # BLD AUTO: 16.6 X10(3) UL (ref 4–11)

## 2023-11-23 PROCEDURE — 71045 X-RAY EXAM CHEST 1 VIEW: CPT | Performed by: EMERGENCY MEDICINE

## 2023-11-23 RX ORDER — ACETAMINOPHEN 500 MG
500 TABLET ORAL EVERY 4 HOURS PRN
Status: DISCONTINUED | OUTPATIENT
Start: 2023-11-23 | End: 2023-12-08

## 2023-11-23 RX ORDER — METOPROLOL TARTRATE 1 MG/ML
5 INJECTION, SOLUTION INTRAVENOUS EVERY 4 HOURS PRN
Status: DISCONTINUED | OUTPATIENT
Start: 2023-11-23 | End: 2023-12-08

## 2023-11-23 RX ORDER — NICOTINE POLACRILEX 4 MG
15 LOZENGE BUCCAL
Status: DISCONTINUED | OUTPATIENT
Start: 2023-11-23 | End: 2023-12-08

## 2023-11-23 RX ORDER — SODIUM CHLORIDE 9 MG/ML
INJECTION, SOLUTION INTRAVENOUS CONTINUOUS
Status: DISCONTINUED | OUTPATIENT
Start: 2023-11-23 | End: 2023-11-23

## 2023-11-23 RX ORDER — INSULIN ASPART 100 [IU]/ML
0.2 INJECTION, SOLUTION INTRAVENOUS; SUBCUTANEOUS ONCE
Status: COMPLETED | OUTPATIENT
Start: 2023-11-23 | End: 2023-11-23

## 2023-11-23 RX ORDER — SERTRALINE HYDROCHLORIDE 25 MG/1
25 TABLET, FILM COATED ORAL DAILY
Status: DISCONTINUED | OUTPATIENT
Start: 2023-11-24 | End: 2023-12-08

## 2023-11-23 RX ORDER — NICOTINE POLACRILEX 4 MG
15 LOZENGE BUCCAL
Status: DISCONTINUED | OUTPATIENT
Start: 2023-11-23 | End: 2023-11-23

## 2023-11-23 RX ORDER — MEMANTINE HYDROCHLORIDE 5 MG/1
5 TABLET ORAL EVERY MORNING
Status: DISCONTINUED | OUTPATIENT
Start: 2023-11-24 | End: 2023-12-08

## 2023-11-23 RX ORDER — METOCLOPRAMIDE HYDROCHLORIDE 5 MG/ML
5 INJECTION INTRAMUSCULAR; INTRAVENOUS EVERY 8 HOURS PRN
Status: DISCONTINUED | OUTPATIENT
Start: 2023-11-23 | End: 2023-12-08

## 2023-11-23 RX ORDER — DEXTROSE MONOHYDRATE 25 G/50ML
50 INJECTION, SOLUTION INTRAVENOUS
Status: DISCONTINUED | OUTPATIENT
Start: 2023-11-23 | End: 2023-11-23

## 2023-11-23 RX ORDER — NICOTINE POLACRILEX 4 MG
30 LOZENGE BUCCAL
Status: DISCONTINUED | OUTPATIENT
Start: 2023-11-23 | End: 2023-12-08

## 2023-11-23 RX ORDER — ONDANSETRON 2 MG/ML
4 INJECTION INTRAMUSCULAR; INTRAVENOUS EVERY 6 HOURS PRN
Status: DISCONTINUED | OUTPATIENT
Start: 2023-11-23 | End: 2023-12-08

## 2023-11-23 RX ORDER — HEPARIN SODIUM 5000 [USP'U]/ML
5000 INJECTION, SOLUTION INTRAVENOUS; SUBCUTANEOUS EVERY 8 HOURS SCHEDULED
Status: DISCONTINUED | OUTPATIENT
Start: 2023-11-23 | End: 2023-12-08

## 2023-11-23 RX ORDER — NICOTINE POLACRILEX 4 MG
30 LOZENGE BUCCAL
Status: DISCONTINUED | OUTPATIENT
Start: 2023-11-23 | End: 2023-11-23

## 2023-11-23 RX ORDER — SODIUM CHLORIDE 450 MG/100ML
INJECTION, SOLUTION INTRAVENOUS CONTINUOUS
Status: DISCONTINUED | OUTPATIENT
Start: 2023-11-23 | End: 2023-11-24

## 2023-11-23 RX ORDER — SODIUM CHLORIDE 9 MG/ML
1000 INJECTION, SOLUTION INTRAVENOUS ONCE
Status: COMPLETED | OUTPATIENT
Start: 2023-11-23 | End: 2023-11-23

## 2023-11-23 RX ORDER — DILTIAZEM HYDROCHLORIDE 120 MG/1
120 CAPSULE, EXTENDED RELEASE ORAL DAILY
Status: DISCONTINUED | OUTPATIENT
Start: 2023-11-24 | End: 2023-11-28

## 2023-11-23 RX ORDER — MEMANTINE HYDROCHLORIDE 10 MG/1
10 TABLET ORAL EVERY EVENING
Status: DISCONTINUED | OUTPATIENT
Start: 2023-11-23 | End: 2023-12-08

## 2023-11-23 RX ORDER — DEXTROSE MONOHYDRATE 25 G/50ML
50 INJECTION, SOLUTION INTRAVENOUS
Status: DISCONTINUED | OUTPATIENT
Start: 2023-11-23 | End: 2023-12-08

## 2023-11-23 RX ORDER — SODIUM CHLORIDE, SODIUM LACTATE, POTASSIUM CHLORIDE, CALCIUM CHLORIDE 600; 310; 30; 20 MG/100ML; MG/100ML; MG/100ML; MG/100ML
INJECTION, SOLUTION INTRAVENOUS ONCE
Status: COMPLETED | OUTPATIENT
Start: 2023-11-23 | End: 2023-11-23

## 2023-11-23 RX ORDER — ACETAMINOPHEN 650 MG/1
650 SUPPOSITORY RECTAL EVERY 4 HOURS PRN
Status: DISCONTINUED | OUTPATIENT
Start: 2023-11-23 | End: 2023-12-08

## 2023-11-23 NOTE — ED INITIAL ASSESSMENT (HPI)
Pt started acting lethargic around 3 hours ago. Pt is normally nonverbally per EMS. Blood sugar at this facility was 779 EMS BS on route only read High for them.  Pt is awake moving arms  and responds to painful  stimuli

## 2023-11-24 ENCOUNTER — APPOINTMENT (OUTPATIENT)
Dept: GENERAL RADIOLOGY | Facility: HOSPITAL | Age: 88
End: 2023-11-24
Attending: NURSE PRACTITIONER
Payer: MEDICARE

## 2023-11-24 LAB
ANION GAP SERPL CALC-SCNC: 4 MMOL/L (ref 0–18)
ANION GAP SERPL CALC-SCNC: 4 MMOL/L (ref 0–18)
ANION GAP SERPL CALC-SCNC: 6 MMOL/L (ref 0–18)
ANION GAP SERPL CALC-SCNC: 7 MMOL/L (ref 0–18)
ATRIAL RATE: 74 BPM
BASOPHILS # BLD AUTO: 0.09 X10(3) UL (ref 0–0.2)
BASOPHILS NFR BLD AUTO: 0.5 %
BLACTX-M ISLT/SPM QL: NOT DETECTED
BUN BLD-MCNC: 100 MG/DL (ref 9–23)
BUN BLD-MCNC: 100 MG/DL (ref 9–23)
BUN BLD-MCNC: 102 MG/DL (ref 9–23)
BUN BLD-MCNC: 103 MG/DL (ref 9–23)
CALCIUM BLD-MCNC: 8.5 MG/DL (ref 8.5–10.1)
CALCIUM BLD-MCNC: 8.7 MG/DL (ref 8.5–10.1)
CALCIUM BLD-MCNC: 8.8 MG/DL (ref 8.5–10.1)
CALCIUM BLD-MCNC: 9 MG/DL (ref 8.5–10.1)
CALCIUM BLD-MCNC: 9.1 MG/DL (ref 8.5–10.1)
CALCIUM BLD-MCNC: 9.4 MG/DL (ref 8.5–10.1)
CHLORIDE SERPL-SCNC: 132 MMOL/L (ref 98–112)
CHLORIDE SERPL-SCNC: 132 MMOL/L (ref 98–112)
CHLORIDE SERPL-SCNC: 133 MMOL/L (ref 98–112)
CHLORIDE SERPL-SCNC: 135 MMOL/L (ref 98–112)
CO2 SERPL-SCNC: 18 MMOL/L (ref 21–32)
CO2 SERPL-SCNC: 22 MMOL/L (ref 21–32)
CO2 SERPL-SCNC: 24 MMOL/L (ref 21–32)
CO2 SERPL-SCNC: 26 MMOL/L (ref 21–32)
CREAT BLD-MCNC: 3.18 MG/DL
CREAT BLD-MCNC: 3.2 MG/DL
CREAT BLD-MCNC: 3.26 MG/DL
CREAT BLD-MCNC: 3.28 MG/DL
CREAT BLD-MCNC: 3.31 MG/DL
CREAT BLD-MCNC: 3.33 MG/DL
CREAT UR-SCNC: 101 MG/DL
E COLI DNA BLD POS QL NAA+NON-PROBE: DETECTED
EGFRCR SERPLBLD CKD-EPI 2021: 17 ML/MIN/1.73M2 (ref 60–?)
EGFRCR SERPLBLD CKD-EPI 2021: 18 ML/MIN/1.73M2 (ref 60–?)
EGFRCR SERPLBLD CKD-EPI 2021: 18 ML/MIN/1.73M2 (ref 60–?)
EOSINOPHIL # BLD AUTO: 0 X10(3) UL (ref 0–0.7)
EOSINOPHIL NFR BLD AUTO: 0 %
ERYTHROCYTE [DISTWIDTH] IN BLOOD BY AUTOMATED COUNT: 13.6 %
GLUCOSE BLD-MCNC: 105 MG/DL (ref 70–99)
GLUCOSE BLD-MCNC: 108 MG/DL (ref 70–99)
GLUCOSE BLD-MCNC: 108 MG/DL (ref 70–99)
GLUCOSE BLD-MCNC: 114 MG/DL (ref 70–99)
GLUCOSE BLD-MCNC: 121 MG/DL (ref 70–99)
GLUCOSE BLD-MCNC: 131 MG/DL (ref 70–99)
GLUCOSE BLD-MCNC: 131 MG/DL (ref 70–99)
GLUCOSE BLD-MCNC: 132 MG/DL (ref 70–99)
GLUCOSE BLD-MCNC: 145 MG/DL (ref 70–99)
GLUCOSE BLD-MCNC: 145 MG/DL (ref 70–99)
GLUCOSE BLD-MCNC: 150 MG/DL (ref 70–99)
GLUCOSE BLD-MCNC: 155 MG/DL (ref 70–99)
GLUCOSE BLD-MCNC: 162 MG/DL (ref 70–99)
GLUCOSE BLD-MCNC: 170 MG/DL (ref 70–99)
GLUCOSE BLD-MCNC: 175 MG/DL (ref 70–99)
GLUCOSE BLD-MCNC: 183 MG/DL (ref 70–99)
GLUCOSE BLD-MCNC: 197 MG/DL (ref 70–99)
GLUCOSE BLD-MCNC: 202 MG/DL (ref 70–99)
GLUCOSE BLD-MCNC: 216 MG/DL (ref 70–99)
GLUCOSE BLD-MCNC: 57 MG/DL (ref 70–99)
GLUCOSE BLD-MCNC: 68 MG/DL (ref 70–99)
GLUCOSE BLD-MCNC: 73 MG/DL (ref 70–99)
GLUCOSE BLD-MCNC: 92 MG/DL (ref 70–99)
GLUCOSE BLD-MCNC: 93 MG/DL (ref 70–99)
GLUCOSE BLD-MCNC: >600 MG/DL (ref 70–99)
GLUCOSE BLD-MCNC: >600 MG/DL (ref 70–99)
HCT VFR BLD AUTO: 34.7 %
HGB BLD-MCNC: 10.8 G/DL
IMM GRANULOCYTES # BLD AUTO: 0.13 X10(3) UL (ref 0–1)
IMM GRANULOCYTES NFR BLD: 0.7 %
LACTATE SERPL-SCNC: 2.9 MMOL/L (ref 0.4–2)
LACTATE SERPL-SCNC: 3.3 MMOL/L (ref 0.4–2)
LACTATE SERPL-SCNC: 3.6 MMOL/L (ref 0.4–2)
LYMPHOCYTES # BLD AUTO: 0.87 X10(3) UL (ref 1–4)
LYMPHOCYTES NFR BLD AUTO: 4.6 %
MAGNESIUM SERPL-MCNC: 2.4 MG/DL (ref 1.6–2.6)
MCH RBC QN AUTO: 30.9 PG (ref 26–34)
MCHC RBC AUTO-ENTMCNC: 31.1 G/DL (ref 31–37)
MCV RBC AUTO: 99.1 FL
MONOCYTES # BLD AUTO: 0.4 X10(3) UL (ref 0.1–1)
MONOCYTES NFR BLD AUTO: 2.1 %
NEUTROPHILS # BLD AUTO: 17.25 X10 (3) UL (ref 1.5–7.7)
NEUTROPHILS # BLD AUTO: 17.25 X10(3) UL (ref 1.5–7.7)
NEUTROPHILS NFR BLD AUTO: 92.1 %
OSMOLALITY SERPL CALC.SUM OF ELEC: 355 MOSM/KG (ref 275–295)
OSMOLALITY SERPL CALC.SUM OF ELEC: 364 MOSM/KG (ref 275–295)
OSMOLALITY SERPL CALC.SUM OF ELEC: 366 MOSM/KG (ref 275–295)
OSMOLALITY SERPL CALC.SUM OF ELEC: 372 MOSM/KG (ref 275–295)
OSMOLALITY SERPL CALC.SUM OF ELEC: 373 MOSM/KG (ref 275–295)
OSMOLALITY SERPL CALC.SUM OF ELEC: 374 MOSM/KG (ref 275–295)
P AXIS: 49 DEGREES
P-R INTERVAL: 144 MS
PLATELET # BLD AUTO: 135 10(3)UL (ref 150–450)
POTASSIUM SERPL-SCNC: 3.1 MMOL/L (ref 3.5–5.1)
POTASSIUM SERPL-SCNC: 3.3 MMOL/L (ref 3.5–5.1)
POTASSIUM SERPL-SCNC: 3.3 MMOL/L (ref 3.5–5.1)
POTASSIUM SERPL-SCNC: 3.5 MMOL/L (ref 3.5–5.1)
POTASSIUM SERPL-SCNC: 3.8 MMOL/L (ref 3.5–5.1)
POTASSIUM SERPL-SCNC: 4.3 MMOL/L (ref 3.5–5.1)
Q-T INTERVAL: 446 MS
QRS DURATION: 146 MS
QTC CALCULATION (BEZET): 495 MS
R AXIS: 43 DEGREES
RBC # BLD AUTO: 3.5 X10(6)UL
RBC #/AREA URNS AUTO: >10 /HPF
SODIUM SERPL-SCNC: 157 MMOL/L (ref 136–145)
SODIUM SERPL-SCNC: 161 MMOL/L (ref 136–145)
SODIUM SERPL-SCNC: 161 MMOL/L (ref 136–145)
SODIUM SERPL-SCNC: 163 MMOL/L (ref 136–145)
SODIUM SERPL-SCNC: 164 MMOL/L (ref 136–145)
SODIUM SERPL-SCNC: 165 MMOL/L (ref 136–145)
SODIUM SERPL-SCNC: 72 MMOL/L
SP GR UR REFRACTOMETRY: 1.02 (ref 1–1.03)
T AXIS: -67 DEGREES
TROPONIN I SERPL HS-MCNC: 174 NG/L
TROPONIN I SERPL HS-MCNC: 205 NG/L
UUN UR-MCNC: 733 MG/DL
VENTRICULAR RATE: 74 BPM
WBC # BLD AUTO: 18.7 X10(3) UL (ref 4–11)
WBC #/AREA URNS AUTO: >50 /HPF
WBC CLUMPS UR QL AUTO: PRESENT /HPF

## 2023-11-24 PROCEDURE — 71045 X-RAY EXAM CHEST 1 VIEW: CPT | Performed by: NURSE PRACTITIONER

## 2023-11-24 PROCEDURE — 3E033XZ INTRODUCTION OF VASOPRESSOR INTO PERIPHERAL VEIN, PERCUTANEOUS APPROACH: ICD-10-PCS | Performed by: INTERNAL MEDICINE

## 2023-11-24 PROCEDURE — 99223 1ST HOSP IP/OBS HIGH 75: CPT | Performed by: INTERNAL MEDICINE

## 2023-11-24 RX ORDER — DEXTROSE MONOHYDRATE 50 MG/ML
INJECTION, SOLUTION INTRAVENOUS CONTINUOUS
Status: DISCONTINUED | OUTPATIENT
Start: 2023-11-24 | End: 2023-11-25

## 2023-11-24 RX ORDER — DILTIAZEM HYDROCHLORIDE 5 MG/ML
INJECTION INTRAVENOUS
Status: DISPENSED
Start: 2023-11-24 | End: 2023-11-24

## 2023-11-24 RX ORDER — SODIUM CHLORIDE 450 MG/100ML
INJECTION, SOLUTION INTRAVENOUS CONTINUOUS
Status: DISCONTINUED | OUTPATIENT
Start: 2023-11-24 | End: 2023-11-24

## 2023-11-24 RX ORDER — DEXTROSE AND SODIUM CHLORIDE 5; .45 G/100ML; G/100ML
125 INJECTION, SOLUTION INTRAVENOUS CONTINUOUS PRN
Status: DISCONTINUED | OUTPATIENT
Start: 2023-11-24 | End: 2023-11-24

## 2023-11-24 RX ORDER — DEXTROSE MONOHYDRATE 50 MG/ML
INJECTION, SOLUTION INTRAVENOUS CONTINUOUS
Status: DISCONTINUED | OUTPATIENT
Start: 2023-11-24 | End: 2023-11-24

## 2023-11-24 RX ORDER — METOPROLOL TARTRATE 1 MG/ML
5 INJECTION, SOLUTION INTRAVENOUS EVERY 6 HOURS
Status: DISCONTINUED | OUTPATIENT
Start: 2023-11-24 | End: 2023-11-27

## 2023-11-24 RX ORDER — POTASSIUM CHLORIDE 14.9 MG/ML
20 INJECTION INTRAVENOUS ONCE
Status: COMPLETED | OUTPATIENT
Start: 2023-11-24 | End: 2023-11-24

## 2023-11-24 RX ORDER — DILTIAZEM HYDROCHLORIDE 100 MG/1
INJECTION, POWDER, LYOPHILIZED, FOR SOLUTION INTRAVENOUS
Status: DISPENSED
Start: 2023-11-24 | End: 2023-11-24

## 2023-11-24 RX ORDER — CHOLECALCIFEROL (VITAMIN D3) 125 MCG
1 CAPSULE ORAL NIGHTLY
COMMUNITY
End: 2023-12-08

## 2023-11-24 RX ORDER — SODIUM BICARBONATE 325 MG/1
325 TABLET ORAL AS NEEDED
Status: DISCONTINUED | OUTPATIENT
Start: 2023-11-24 | End: 2023-12-08

## 2023-11-24 RX ORDER — DILTIAZEM HYDROCHLORIDE 5 MG/ML
10 INJECTION INTRAVENOUS ONCE
Status: COMPLETED | OUTPATIENT
Start: 2023-11-24 | End: 2023-11-24

## 2023-11-24 RX ORDER — DILTIAZEM HYDROCHLORIDE 5 MG/ML
10 INJECTION INTRAVENOUS ONCE
Status: DISCONTINUED | OUTPATIENT
Start: 2023-11-24 | End: 2023-11-27

## 2023-11-24 RX ORDER — QUETIAPINE FUMARATE 25 MG/1
12.5 TABLET, FILM COATED ORAL 2 TIMES DAILY PRN
COMMUNITY
End: 2023-12-08

## 2023-11-24 NOTE — PLAN OF CARE
Pt received on 3 L nc from ER. Insulin drip started per ED. Pt restless, moving all extremities. Nonverbal- not following commands. Febrile- see flowsheet- tylenol given. Dr. Washington Lehman updated on pt condition- orders received. Dr. Alejo Nolasco notified. Orders received  Dr. Holden Mauro updated on new consult and throughout the night. Levophed on/off for sbp <90.

## 2023-11-24 NOTE — PROGRESS NOTES
Admission Note:  Assumed care of patient around 1700 from ICU. Patient received with tube feedings, Cardizem gtts and KVO infusing. Also with bilateral upper extremity soft restraints (Mits), Patient pulses +2, no sign of complications. Patient somnolent and disoriented x4, nonverbal at baseline. Patient on room air, SpO2 >90%. Afib with variable HR between 80-140s on tele. No signs of pain at this time. Incontinent of bowel and bladder, purewick and brief in place. Skin assessment performed, nonblanchable redness, mepilex in place. Total care, unable to follow commands, turn q2, call light within reach, tolerating care well.     1732:   Patient hypoglycemic, given IV glucose given. MD notified. Monitoring sugar per protocol.      POC:  - NPO/ Tube feedings   - Diltz gtts  - ABX  - SLP when able   - US kidneys  - Monitor sugars

## 2023-11-24 NOTE — PROGRESS NOTES
[facility] Pharmacy Note:  Renal Adjustment for piperacillin/tazobactam (Baljit Binder)    Luz Sanders is a 80year old patient who has been prescribed piperacillin/tazobactam (ZOSYN) 3.375 g every 8 hrs. The estimated creatinine clearance is 9.2 mL/min (A) (based on SCr of 3.01 mg/dL (H)). The dose has been adjusted to piperacillin/tazobactam (ZOSYN) 3.375 g every 12 hrs per hospital renal dose adjustment protocol for treatment of pneumonia. Pharmacy will follow and adjust dose as warranted for additional renal function changes.     Thank you,    Carlos Murillo, PharmD  11/23/2023  10:33 PM

## 2023-11-24 NOTE — ED QUICK NOTES
Orders for admission, patient is aware of plan and ready to go upstairs. Any questions, please call ED RN Guzman Goss at extension [phone].      Patient Covid vaccination status: Fully vaccinated     COVID Test Ordered in ED: None    COVID Suspicion at Admission: N/A    Running Infusions:    insulin regular 9 Units/hr (11/23/23 2024)    [place] Chloride 62.5ml/hr  Lactated ringers 125ml/hr  Mental Status/LOC at time of transport: a/0/0    Other pertinent information:   CIWA score: N/A   NIH score:  N/A

## 2023-11-24 NOTE — PROGRESS NOTES
[facility] Pharmacy Note:  Renal Dose Adjustment for Metoclopramide (REGLAN)    Zeferino Owens has been prescribed Metoclopramide (REGLAN) 10 mg every 8 hours as needed for nausea/vomiting,. Estimated Creatinine Clearance: 9.2 mL/min (A) (based on SCr of 3.01 mg/dL (H)). Calculated creatinine clearance is < 40 ml/min, therefore, the dose of Metoclopramide (REGLAN) has been changed to 5 mg every 8 hours as needed for nausea/vomiting per P&T approved protocol. Pharmacy will continue to follow, and if renal function improves, will resume the original order.        Thank you,  Elisa Arambula, PharmD  11/23/2023 10:39 PM

## 2023-11-24 NOTE — SLP NOTE
Spoke with RN prior to entering room. RN reported patient is inappropriate for swallowing assessment as he is too lethargic and not following commands. Will continue to follow and assess as patient able to participate.

## 2023-11-24 NOTE — PROGRESS NOTES
Report given to CTU 2 RN. Pt to transfer to floor, family aware of move. VSS. Tube feeds initiated through [facility] prior to transfer. POC continues.

## 2023-11-24 NOTE — ED QUICK NOTES
Pt was started on an insulin drip and will be transferred up to ICU. Family is at bedside with doctor informing son of plan of care.  Patient vitals are stable at this time

## 2023-11-24 NOTE — CONSULTS
[address] Cardiology  Report of Consultation    Fatimah Darden Patient Status:  Inpatient    8/15/1933 MRN [id number]   [facility] 4SW-A Attending Valerie Sunshine MD   Hosp Day # 1 PCP Enma Calle MD     Reason for Consultation:   AF    History of Present Illness:   Fatimah Darden is a(n) 80year old male with non-verbal level dementia who was xfered from his memory care facility for hyperglycemia. He had glucose>700, sodium 157, and mildly elevated troponin. He was admitted to the ICU. He can not given any history. However he does not appear to be in any pain or distress. He was having burst of SVT and then went into afib. Dilt drip initiated.     Past Medical History:   Past Medical History:   Diagnosis Date    Alzheimer disease ([address]     Aortic atherosclerosis ([address]     declines cardiac calcium score    Aortic ectasia ([address] 2015    noted on CXR    BPH (benign prostatic hyperplasia)     Chronic anemia     DDD (degenerative disc disease), lumbar 2012 MRI    History of cardiovascular stress test     normal    Impotence of organic origin     Memory loss     Other and unspecified hyperlipidemia     PAC (premature atrial contraction)     PSVT (paroxysmal supraventricular tachycardia)     PVC's (premature ventricular contractions)     RBBB     Type II or unspecified type diabetes mellitus without mention of complication, not stated as uncontrolled     Unspecified essential hypertension        Social History:   Smoking:  None  Alcohol:  None    Family History:   No family history of premature arthrosclerotic heart disease     Medications:   Scheduled:    dilTIAZem        dilTIAZem        insulin detemir  10 Units Subcutaneous Daily    insulin aspart  1-50 Units Subcutaneous TID AC and HS    dilTIAZem  10 mg Intravenous Once    dilTIAZem ER  120 mg Oral Daily    memantine  5 mg Oral QAM    And    memantine  10 mg Oral QPM    sertraline  25 mg Oral Daily    heparin  5,000 Units Subcutaneous Q8H [address]    piperacillin-tazobactam  3.375 g Intravenous q12h       Continuous Infusion:    norepinephrine Stopped (11/24/23 0350)    insulin regular Stopped (11/24/23 1030)    dextrose 5%-sodium chloride 0.45% 125 mL/hr (11/24/23 1007)    dilTIAZem 10 mg/hr (11/24/23 1050)       PRN Medications:   dilTIAZem, dilTIAZem, dextrose 5%-sodium chloride 0.45%, acetaminophen, ondansetron, metoclopramide, glucose **OR** glucose **OR** glucose-vitamin C **OR** dextrose **OR** glucose **OR** glucose **OR** glucose-vitamin C, metoprolol, acetaminophen    Outpatient Medications:   Current Facility-Administered Medications on File Prior to Encounter   Medication Dose Route Frequency Provider Last Rate Last Admin    [COMPLETED] LORazepam (Ativan) 2 mg/mL injection 1 mg  1 mg Intravenous Once Jesús Hinton MD   1 mg at 10/07/23 1054     Current Outpatient Medications on File Prior to Encounter   Medication Sig Dispense Refill    Melatonin 5 MG Oral Tab Take 1 tablet (5 mg total) by mouth at bedtime. QUEtiapine 25 MG Oral Tab Take 0.5 tablets (12.5 mg total) by mouth 2 (two) times daily as needed (anxiety). dilTIAZem  MG Oral Capsule SR 24 Hr Take 1 capsule (120 mg total) by mouth daily. Per cardiology 30 capsule 0    sertraline 25 MG Oral Tab Take 2 tablets (50 mg total) by mouth daily. acetaminophen 650 MG Rectal Suppos Place 1 suppository (650 mg total) rectally every 6 (six) hours as needed for Fever or Pain. senna-docusate 8.6-50 MG Oral Tab Take 1 tablet by mouth 2 (two) times daily. Memantine HCl ER 21 MG Oral Capsule SR 24 Hr Take 21 mg by mouth daily. metFORMIN HCl 500 MG Oral Tab Take 2 tablets (1,000 mg total) by mouth 2 (two) times daily. (Patient taking differently: Take 1 tablet by mouth 2 (two) times daily.) 360 tablet 3       Allergies:   No Known Allergies    Review of Systems:   No fevers, chills, change in weight or bowel habits.   Ten point review of systems is otherwise negative or unremarkable. Physical Exam:   Vitals:    11/24/23 1000   BP: 106/61   Pulse: (!) 147   Resp: 21   Temp:      Wt Readings from Last 3 Encounters:   11/23/23 87 lb 15.4 oz (39.9 kg)   10/07/23 121 lb 4.1 oz (55 kg)   08/02/23 120 lb (54.4 kg)           General: Well developed, well nourished male. Pt is in no acute distress. HEENT:   Normocephalic. Atraumatic. Eyes with no scleral icterus. Neck: Supple. No JVD. Carotids 2+ and equal in symmetric fashion. No bruits are noted. Cardiac: Regular rate and rhythm. There is a normal S1 and S2. No S3 or S4. No murmurs, rubs, or gallops. PMI is non-displaced with a normal apical impulse. Lungs: Clear to ascultation bilaterally. No focal rales, rhonchi, or wheezes. Good air movement is noted throughout all lung fields. Abdomen: Soft. Non-distended. Non-tender. Bowel sounds are present and normoactive. No guarding or rebound. Extremities: Extremities do not demonstrate any evidence of peripheral edema. No cyanosis or clubbing of the digits is appreciated. Femoral, Dorsalis Pedis, and Posterior Tibialis  pulses are 2+ and equal in a symmetric fashion. Neurologic: Alert and oriented, normal affect. No gross deficit appreciated. Integument:  No visible rashes are appreciated.       Laboratories and Data:   Labs:    Recent Labs   Lab 11/23/23  1329 11/23/23  1610 11/23/23  1901 11/24/23  0002 11/24/23  0407 11/24/23  0705   * 779*   < > 150* 183* 131*   BUN 98* 102*   < > 102* 100* 102*   CREATSERUM 2.98* 3.41*   < > 3.31* 3.28* 3.18*   CA 9.6 9.6   < > 9.4 9.0 9.1   ALB 2.7* 2.8*  --   --   --   --    * 156*   < > 164* 163* 165*   K 3.2* 3.8   < > 3.1* 3.3* 3.3*   * 118*   < > 133* 132* 132*   CO2 24.0 27.0   < > 24.0 24.0 26.0   ALKPHO 125* 144*  --   --   --   --    AST 16 15  --   --   --   --    ALT 28 30  --   --   --   --    BILT 0.7 0.6  --   --   --   --    TP 7.0 7.9  --   --   --   --     < > = values in this interval not displayed. Recent Labs   Lab 11/23/23  1329 11/23/23  1610 11/24/23  0408   RBC 3.59* 3.94 3.50*   HGB 11.3* 12.3* 10.8*   HCT 36.2* 39.3 34.7*   .8* 99.7 99.1   MCH 31.5 31.2 30.9   MCHC 31.2 31.3 31.1   RDW 13.7 13.7 13.6   NEPRELIM 17.23* 15.53* 17.25*   WBC 18.5* 16.6* 18.7*   .0 173.0 135.0*       No results for input(s): \"PTP\", \"INR\" in the last 168 hours. No results for input(s): \"TROP\", \"CK\" in the last 168 hours. Diagnostics:   Tele: AF, rate 110s. EKG: Sinus. RBBB. Anterior T inversions. Echo: 8/23  Conclusions:     1. Left ventricle: The cavity size was normal. Wall thickness was mildly      increased. Systolic function was normal. The estimated ejection fraction      was 55-60%. 2. Left atrium: The atrium was mildly dilated. The left atrial volume was      mildly increased. 3. Right atrium: The atrium was mildly dilated. 4. Aortic valve: There was moderate regurgitation. 5. Aortic root: The aortic root was at the upper limits of normal and 3.8cm      diameter. 6. Mitral valve: The annulus was moderately calcified. The leaflets were      moderately calcified. There was mild to moderate regurgitation. 7. Tricuspid valve: There was mild-moderate regurgitation. Impressions:  No previous study was available for comparison. Assessment:  1. AF  2. Mildly elevated troponin  3. Hyperglycemia  4. Dementia, severe, non-verbal  5. HTN      Plan:  1. AF: Dilt drip for now. PO metoprolol if taking PO, can do IV if not effectively taking meds. 2. Troponin: Would not follow. 3. Hyperglycemia: Per primary. 4. HTN: Per primary.     Maria Teresa Toth MD  11/24/2023  12:09 PM

## 2023-11-25 ENCOUNTER — APPOINTMENT (OUTPATIENT)
Dept: ULTRASOUND IMAGING | Facility: HOSPITAL | Age: 88
End: 2023-11-25
Attending: INTERNAL MEDICINE
Payer: MEDICARE

## 2023-11-25 LAB
ANION GAP SERPL CALC-SCNC: 5 MMOL/L (ref 0–18)
ANION GAP SERPL CALC-SCNC: 5 MMOL/L (ref 0–18)
ANION GAP SERPL CALC-SCNC: 8 MMOL/L (ref 0–18)
BASOPHILS # BLD AUTO: 0.03 X10(3) UL (ref 0–0.2)
BASOPHILS NFR BLD AUTO: 0.2 %
BUN BLD-MCNC: 107 MG/DL (ref 9–23)
BUN BLD-MCNC: 108 MG/DL (ref 9–23)
BUN BLD-MCNC: 112 MG/DL (ref 9–23)
CALCIUM BLD-MCNC: 8.4 MG/DL (ref 8.5–10.1)
CALCIUM BLD-MCNC: 8.4 MG/DL (ref 8.5–10.1)
CALCIUM BLD-MCNC: 8.6 MG/DL (ref 8.5–10.1)
CHLORIDE SERPL-SCNC: 123 MMOL/L (ref 98–112)
CHLORIDE SERPL-SCNC: 124 MMOL/L (ref 98–112)
CHLORIDE SERPL-SCNC: 124 MMOL/L (ref 98–112)
CO2 SERPL-SCNC: 20 MMOL/L (ref 21–32)
CO2 SERPL-SCNC: 23 MMOL/L (ref 21–32)
CO2 SERPL-SCNC: 24 MMOL/L (ref 21–32)
CREAT BLD-MCNC: 3.34 MG/DL
CREAT BLD-MCNC: 3.38 MG/DL
CREAT BLD-MCNC: 3.4 MG/DL
EGFRCR SERPLBLD CKD-EPI 2021: 16 ML/MIN/1.73M2 (ref 60–?)
EGFRCR SERPLBLD CKD-EPI 2021: 17 ML/MIN/1.73M2 (ref 60–?)
EGFRCR SERPLBLD CKD-EPI 2021: 17 ML/MIN/1.73M2 (ref 60–?)
EOSINOPHIL # BLD AUTO: 0 X10(3) UL (ref 0–0.7)
EOSINOPHIL NFR BLD AUTO: 0 %
ERYTHROCYTE [DISTWIDTH] IN BLOOD BY AUTOMATED COUNT: 13.9 %
GLUCOSE BLD-MCNC: 310 MG/DL (ref 70–99)
GLUCOSE BLD-MCNC: 330 MG/DL (ref 70–99)
GLUCOSE BLD-MCNC: 432 MG/DL (ref 70–99)
GLUCOSE BLD-MCNC: 483 MG/DL (ref 70–99)
GLUCOSE BLD-MCNC: 496 MG/DL (ref 70–99)
GLUCOSE BLD-MCNC: 497 MG/DL (ref 70–99)
GLUCOSE BLD-MCNC: 516 MG/DL (ref 70–99)
HCT VFR BLD AUTO: 30.9 %
HGB BLD-MCNC: 10 G/DL
IMM GRANULOCYTES # BLD AUTO: 0.25 X10(3) UL (ref 0–1)
IMM GRANULOCYTES NFR BLD: 1.4 %
LYMPHOCYTES # BLD AUTO: 0.63 X10(3) UL (ref 1–4)
LYMPHOCYTES NFR BLD AUTO: 3.6 %
MCH RBC QN AUTO: 31.5 PG (ref 26–34)
MCHC RBC AUTO-ENTMCNC: 32.4 G/DL (ref 31–37)
MCV RBC AUTO: 97.5 FL
MONOCYTES # BLD AUTO: 0.2 X10(3) UL (ref 0.1–1)
MONOCYTES NFR BLD AUTO: 1.2 %
NEUTROPHILS # BLD AUTO: 16.24 X10 (3) UL (ref 1.5–7.7)
NEUTROPHILS # BLD AUTO: 16.24 X10(3) UL (ref 1.5–7.7)
NEUTROPHILS NFR BLD AUTO: 93.6 %
OSMOLALITY SERPL CALC.SUM OF ELEC: 361 MOSM/KG (ref 275–295)
OSMOLALITY SERPL CALC.SUM OF ELEC: 368 MOSM/KG (ref 275–295)
OSMOLALITY SERPL CALC.SUM OF ELEC: 374 MOSM/KG (ref 275–295)
PLATELET # BLD AUTO: 88 10(3)UL (ref 150–450)
POTASSIUM SERPL-SCNC: 3.5 MMOL/L (ref 3.5–5.1)
POTASSIUM SERPL-SCNC: 3.6 MMOL/L (ref 3.5–5.1)
POTASSIUM SERPL-SCNC: 3.7 MMOL/L (ref 3.5–5.1)
RBC # BLD AUTO: 3.17 X10(6)UL
SODIUM SERPL-SCNC: 151 MMOL/L (ref 136–145)
SODIUM SERPL-SCNC: 152 MMOL/L (ref 136–145)
SODIUM SERPL-SCNC: 153 MMOL/L (ref 136–145)
WBC # BLD AUTO: 17.4 X10(3) UL (ref 4–11)

## 2023-11-25 PROCEDURE — 76770 US EXAM ABDO BACK WALL COMP: CPT | Performed by: INTERNAL MEDICINE

## 2023-11-25 RX ORDER — SODIUM CHLORIDE 450 MG/100ML
INJECTION, SOLUTION INTRAVENOUS CONTINUOUS
Status: DISCONTINUED | OUTPATIENT
Start: 2023-11-25 | End: 2023-11-26

## 2023-11-25 NOTE — PROGRESS NOTES
[facility]  Report of Consultation    Elena Sapp Patient Status:  Inpatient    8/15/1933 MRN [id number]   [facility] 4SW-A Attending Bacilio Jack MD   Hosp Day # 2 PCP Poppy Ruiz MD     Reason for Consultation:   AF    History of Present Illness:   Elena Sapp is a(n) 80year old male with non-verbal level dementia who was xfered from his memory care facility for hyperglycemia. He had glucose>700, sodium 157, and mildly elevated troponin. He was admitted to the ICU. He can not given any history. However he does not appear to be in any pain or distress. He was having burst of SVT and then went into afib.   Dilt drip initiated now on IV Afib rate 70's overnight     Past Medical History:   Past Medical History:   Diagnosis Date    Alzheimer disease ([facility] [address]     Aortic atherosclerosis ([facility] [address]     declines cardiac calcium score    Aortic ectasia (HCC) 2015    noted on CXR    BPH (benign prostatic hyperplasia)     Chronic anemia     DDD (degenerative disc disease), lumbar 2012 MRI    History of cardiovascular stress test     normal    Impotence of organic origin     Memory loss     Other and unspecified hyperlipidemia     PAC (premature atrial contraction)     PSVT (paroxysmal supraventricular tachycardia)     PVC's (premature ventricular contractions)     RBBB     Type II or unspecified type diabetes mellitus without mention of complication, not stated as uncontrolled     Unspecified essential hypertension        Social History:   Smoking:  None  Alcohol:  None    Family History:   No family history of premature arthrosclerotic heart disease     Medications:   Scheduled:    insulin aspart  2-10 Units Subcutaneous TID AC and HS    insulin detemir  10 Units Subcutaneous Daily    dilTIAZem  10 mg Intravenous Once    metoprolol  5 mg Intravenous Q6H    dilTIAZem ER  120 mg Oral Daily    memantine  5 mg Oral QAM    And    memantine  10 mg Oral QPM    sertraline 25 mg Oral Daily    heparin  5,000 Units Subcutaneous Q8H [address]    piperacillin-tazobactam  3.375 g Intravenous q12h       Continuous Infusion:    sodium chloride 125 mL/hr at 11/25/23 0820    dilTIAZem 2.5 mg/hr (11/25/23 0629)    dextrose 10%         PRN Medications:   dextrose 10%, lipase-protease-amylase (Lip-Prot-Amyl) **AND** sodium bicarbonate, acetaminophen, ondansetron, metoclopramide, glucose **OR** glucose **OR** glucose-vitamin C **OR** dextrose **OR** glucose **OR** glucose **OR** glucose-vitamin C, metoprolol, acetaminophen    Outpatient Medications:   Current Facility-Administered Medications on File Prior to Encounter   Medication Dose Route Frequency Provider Last Rate Last Admin    [COMPLETED] LORazepam (Ativan) 2 mg/mL injection 1 mg  1 mg Intravenous Once Kalin Smith MD   1 mg at 10/07/23 1054     Current Outpatient Medications on File Prior to Encounter   Medication Sig Dispense Refill    Melatonin 5 MG Oral Tab Take 1 tablet (5 mg total) by mouth at bedtime. QUEtiapine 25 MG Oral Tab Take 0.5 tablets (12.5 mg total) by mouth 2 (two) times daily as needed (anxiety). dilTIAZem  MG Oral Capsule SR 24 Hr Take 1 capsule (120 mg total) by mouth daily. Per cardiology 30 capsule 0    sertraline 25 MG Oral Tab Take 2 tablets (50 mg total) by mouth daily. acetaminophen 650 MG Rectal Suppos Place 1 suppository (650 mg total) rectally every 6 (six) hours as needed for Fever or Pain. senna-docusate 8.6-50 MG Oral Tab Take 1 tablet by mouth 2 (two) times daily. Memantine HCl ER 21 MG Oral Capsule SR 24 Hr Take 21 mg by mouth daily. metFORMIN HCl 500 MG Oral Tab Take 2 tablets (1,000 mg total) by mouth 2 (two) times daily. (Patient taking differently: Take 1 tablet by mouth 2 (two) times daily.) 360 tablet 3       Allergies:   No Known Allergies    Review of Systems:   No fevers, chills, change in weight or bowel habits.   Ten point review of systems is otherwise negative or unremarkable. Physical Exam:   Vitals:    11/25/23 0150   BP: 116/44   Pulse: 75   Resp: 24   Temp:      Wt Readings from Last 3 Encounters:   11/23/23 87 lb 15.4 oz (39.9 kg)   10/07/23 121 lb 4.1 oz (55 kg)   08/02/23 120 lb (54.4 kg)           General: Well developed, well nourished male. Pt is in no acute distress. HEENT:   Normocephalic. Atraumatic. Eyes with no scleral icterus. Neck: Supple. No JVD. Carotids 2+ and equal in symmetric fashion. No bruits are noted. Cardiac: Irregular rate and rhythm. There is a normal S1 and S2. No S3 or S4. No murmurs, rubs, or gallops. PMI is non-displaced with a normal apical impulse. Lungs: Clear to ascultation bilaterally. No focal rales, rhonchi, or wheezes. Good air movement is noted throughout all lung fields. Abdomen: Soft. Non-distended. Non-tender. Bowel sounds are present and normoactive. No guarding or rebound. Extremities: Extremities do not demonstrate any evidence of peripheral edema. No cyanosis or clubbing of the digits is appreciated. Femoral, Dorsalis Pedis, and Posterior Tibialis  pulses are 2+ and equal in a symmetric fashion. Neurologic: Alert and oriented, normal affect. No gross deficit appreciated. Integument:  No visible rashes are appreciated.       Laboratories and Data:   Labs:    Recent Labs   Lab 11/23/23  1329 11/23/23  1610 11/23/23  1901 11/24/23  1606 11/24/23 2006 11/25/23  0651   * 779*   < > 73 108* 497*   BUN 98* 102*   < > 103* 100* 112*   CREATSERUM 2.98* 3.41*   < > 3.20* 3.26* 3.38*   CA 9.6 9.6   < > 8.8 8.7 8.6   ALB 2.7* 2.8*  --   --   --   --    * 156*   < > 157* 161* 153*   K 3.2* 3.8   < > 4.3 3.8 3.5   * 118*   < > 135* 133* 124*   CO2 24.0 27.0   < > 18.0* 24.0 24.0   ALKPHO 125* 144*  --   --   --   --    AST 16 15  --   --   --   --    ALT 28 30  --   --   --   --    BILT 0.7 0.6  --   --   --   --    TP 7.0 7.9  --   --   --   --     < > = values in this interval not displayed. Recent Labs   Lab 11/23/23  1610 11/24/23  0408 11/25/23  0651   RBC 3.94 3.50* 3.17*   HGB 12.3* 10.8* 10.0*   HCT 39.3 34.7* 30.9*   MCV 99.7 99.1 97.5   MCH 31.2 30.9 31.5   MCHC 31.3 31.1 32.4   RDW 13.7 13.6 13.9   NEPRELIM 15.53* 17.25* 16.24*   WBC 16.6* 18.7* 17.4*   .0 135.0* 88.0*       No results for input(s): \"PTP\", \"INR\" in the last 168 hours. No results for input(s): \"TROP\", \"CK\" in the last 168 hours. Diagnostics:   Tele: AF, rate 110s. EKG: Sinus. RBBB. Anterior T inversions. Echo: 8/23  Conclusions:     1. Left ventricle: The cavity size was normal. Wall thickness was mildly      increased. Systolic function was normal. The estimated ejection fraction      was 55-60%. 2. Left atrium: The atrium was mildly dilated. The left atrial volume was      mildly increased. 3. Right atrium: The atrium was mildly dilated. 4. Aortic valve: There was moderate regurgitation. 5. Aortic root: The aortic root was at the upper limits of normal and 3.8cm      diameter. 6. Mitral valve: The annulus was moderately calcified. The leaflets were      moderately calcified. There was mild to moderate regurgitation. 7. Tricuspid valve: There was mild-moderate regurgitation. Impressions:  No previous study was available for comparison. Assessment:  1. AF controlled rate   2. Mildly elevated troponin  3. Hyperglycemia  4. Dementia, severe, non-verbal  5. HTN BP controlled      Plan:  1. AF: Dilt and   PO metoprolol  IV not able to take orals, on tube feedings,   2. Troponin: Would not follow. 3. Hyperglycemia: Per primary. 4. HTN: Per primary.     [address] [place] [address] cardiology

## 2023-11-25 NOTE — PLAN OF CARE
.Received patient at 1. Pt is non-verbal at baseline-advanced dementia. Tele Rhythm A.fib at start of shift-HR controlled with gtt medicine, oxygen maintained on room ai. Breath sounds clear, diminished. Skin-thin, redness over sacrum covered with mepilex. Last bowel movement is unknown. Patient is incontinent-exernal catheter in place. Patient has been resting comfortably throughout the night shift. Bed is locked and in low position. Call light and personal items within reach.     -soft wrist restraints monitored and documented q 2  -Lab reported Na 161-hospitalist and nephrologist notified.  Received an order for D5W 125cc/hr, glucose check q6  -diltiazem gtt down to 2.5 mg due to HR <80 and NSR as of midnight   -hyperglycemia this AM-vincent GARCIA, received an order for medium dose insulin-sliding scale       Problem: Diabetes/Glucose Control  Goal: Glucose maintained within prescribed range  Description: INTERVENTIONS:  - Monitor Blood Glucose as ordered  - Assess for signs and symptoms of hyperglycemia and hypoglycemia  - Administer ordered medications to maintain glucose within target range  - Assess barriers to adequate nutritional intake and initiate nutrition consult as needed  - Instruct patient on self management of diabetes  Outcome: Progressing       Problem: Delirium  Goal: Minimize duration of delirium  Description: Interventions:  - Encourage use of hearing aids, eye glasses  - Promote highest level of mobility daily  - Provide frequent reorientation  - Promote wakefulness i.e. lights on, blinds open  - Promote sleep, encourage patient's normal rest cycle i.e. lights off, TV off, minimize noise and interruptions  - Encourage family to assist in orientation and promotion of home routines  Outcome: Progressing     Problem: CARDIOVASCULAR - ADULT  Goal: Maintains optimal cardiac output and hemodynamic stability  Description: INTERVENTIONS:  - Monitor vital signs, rhythm, and trends  - Monitor for bleeding, hypotension and signs of decreased cardiac output  - Evaluate effectiveness of vasoactive medications to optimize hemodynamic stability  - Monitor arterial and/or venous puncture sites for bleeding and/or hematoma  - Assess quality of pulses, skin color and temperature  - Assess for signs of decreased coronary artery perfusion - ex.  Angina  - Evaluate fluid balance, assess for edema, trend weights  Outcome: Progressing     Problem: METABOLIC/FLUID AND ELECTROLYTES - ADULT  Goal: Glucose maintained within prescribed range  Description: INTERVENTIONS:  - Monitor Blood Glucose as ordered  - Assess for signs and symptoms of hyperglycemia and hypoglycemia  - Administer ordered medications to maintain glucose within target range  - Assess barriers to adequate nutritional intake and initiate nutrition consult as needed  - Instruct patient on self management of diabetes  Outcome: Progressing  Goal: Electrolytes maintained within normal limits  Description: INTERVENTIONS:  - Monitor labs and rhythm and assess patient for signs and symptoms of electrolyte imbalances  - Administer electrolyte replacement as ordered  - Monitor response to electrolyte replacements, including rhythm and repeat lab results as appropriate  - Fluid restriction as ordered  - Instruct patient on fluid and nutrition restrictions as appropriate  Outcome: Progressing  Goal: Hemodynamic stability and optimal renal function maintained  Description: INTERVENTIONS:  - Monitor labs and assess for signs and symptoms of volume excess or deficit  - Monitor intake, output and patient weight  - Monitor urine specific gravity, serum osmolarity and serum sodium as indicated or ordered  - Monitor response to interventions for patient's volume status, including labs, urine output, blood pressure (other measures as available)  - Encourage oral intake as appropriate  - Instruct patient on fluid and nutrition restrictions as appropriate  Outcome: Progressing     Problem: SKIN/TISSUE INTEGRITY - ADULT  Goal: Skin integrity remains intact  Description: INTERVENTIONS  - Assess and document risk factors for pressure ulcer development  - Assess and document skin integrity  - Monitor for areas of redness and/or skin breakdown  - Initiate interventions, skin care algorithm/standards of care as needed  Outcome: Progressing     Problem: Safety Risk - Non-Violent Restraints  Goal: Patient will remain free from self-harm  Description: INTERVENTIONS:  - Apply the least restrictive restraint to prevent harm  - Notify patient and family of reasons restraints applied  - Assess for any contributing factors to confusion (electrolyte disturbances, delirium, medications)  - Discontinue any unnecessary medical devices as soon as possible  - Assess the patient's physical comfort, circulation, skin condition, hydration, nutrition and elimination needs   - Reorient and redirection as needed  - Assess for the need to continue restraints  Outcome: Progressing

## 2023-11-25 NOTE — SLP NOTE
Pt continues to be lethargic and unsafe for po trials; on tube feedings. Will continue to follow and re-attempt bedside swallow evaluation when more alert & participatory.     Paul Goode MA, [facility]  Pager

## 2023-11-25 NOTE — PLAN OF CARE
Received pt at 0700. Pt is non-verbal, lethargic. He is on room air, lungs are diminished, non-productive on/off cough. Converted to NSR overnight, cardizem turned off per cardiology MD. Pt has soft joe restraints in place on R&L hand. Male purewick in place. Tube feedings running at 40 ml/hour, marked at 55 cm. Pt family updated with plan of care. Approx 0745- patient blood sugar 516- notified Dr Carol Lama, 10 units of insulin given based on sliding scale and fluids switched to sodium chloride 0.45%.   Approx 1140- sepsis alert- notified MD. Sugar 483- give 12 units per hospitalist.     Approx 1640- temperature slowly increasing throughout day, last temp 100.3- tylenol suppository given- notified MD.     Problem: Diabetes/Glucose Control  Goal: Glucose maintained within prescribed range  Description: INTERVENTIONS:  - Monitor Blood Glucose as ordered  - Assess for signs and symptoms of hyperglycemia and hypoglycemia  - Administer ordered medications to maintain glucose within target range  - Assess barriers to adequate nutritional intake and initiate nutrition consult as needed  - Instruct patient on self management of diabetes  11/25/2023 0950 by Ginette Young RN  Outcome: Not Progressing  11/25/2023 0949 by Ginette Young RN  Outcome: Not Progressing     Problem: Patient/Family Goals  Goal: Patient/Family Long Term Goal  Description: Patient's Long Term Goal: stay out of hospital 11-25    Interventions:  - med compliance, follow ups  - trend labs/sugars/vitals    - See additional Care Plan goals for specific interventions  11/25/2023 0950 by Ginette Young RN  Outcome: Progressing  11/25/2023 0949 by Ginette Young RN  Outcome: Progressing  Goal: Patient/Family Short Term Goal  Description: Patient's Short Term Goal: sugar control 11-25    Interventions:   - trend sugars Q6, tube feedings, insulin protocol, fluids    - See additional Care Plan goals for specific interventions  11/25/2023 0950 by Amparo Lazo Efrain Gutierrez RN  Outcome: Not Progressing  11/25/2023 0949 by Nicanor Nunez RN  Outcome: Not Progressing     Problem: Delirium  Goal: Minimize duration of delirium  Description: Interventions:  - Encourage use of hearing aids, eye glasses  - Promote highest level of mobility daily  - Provide frequent reorientation  - Promote wakefulness i.e. lights on, blinds open  - Promote sleep, encourage patient's normal rest cycle i.e. lights off, TV off, minimize noise and interruptions  - Encourage family to assist in orientation and promotion of home routines  11/25/2023 0950 by Nicanor Nunez RN  Outcome: Progressing  11/25/2023 0949 by Nicanor Nunez RN  Outcome: Progressing     Problem: CARDIOVASCULAR - ADULT  Goal: Maintains optimal cardiac output and hemodynamic stability  Description: INTERVENTIONS:  - Monitor vital signs, rhythm, and trends  - Monitor for bleeding, hypotension and signs of decreased cardiac output  - Evaluate effectiveness of vasoactive medications to optimize hemodynamic stability  - Monitor arterial and/or venous puncture sites for bleeding and/or hematoma  - Assess quality of pulses, skin color and temperature  - Assess for signs of decreased coronary artery perfusion - ex.  Angina  - Evaluate fluid balance, assess for edema, trend weights  11/25/2023 0950 by Nicanor Nunez RN  Outcome: Progressing  11/25/2023 0949 by Nicanor Nunez RN  Outcome: Progressing     Problem: METABOLIC/FLUID AND ELECTROLYTES - ADULT  Goal: Glucose maintained within prescribed range  Description: INTERVENTIONS:  - Monitor Blood Glucose as ordered  - Assess for signs and symptoms of hyperglycemia and hypoglycemia  - Administer ordered medications to maintain glucose within target range  - Assess barriers to adequate nutritional intake and initiate nutrition consult as needed  - Instruct patient on self management of diabetes  11/25/2023 0950 by Nicanor Nunez RN  Outcome: Not Progressing  11/25/2023 0949 by Adonay Orr Umu Johnson RN  Outcome: Not Progressing  Goal: Electrolytes maintained within normal limits  Description: INTERVENTIONS:  - Monitor labs and rhythm and assess patient for signs and symptoms of electrolyte imbalances  - Administer electrolyte replacement as ordered  - Monitor response to electrolyte replacements, including rhythm and repeat lab results as appropriate  - Fluid restriction as ordered  - Instruct patient on fluid and nutrition restrictions as appropriate  11/25/2023 0950 by Luisa Cruz RN  Outcome: Progressing  11/25/2023 0949 by Luisa Cruz RN  Outcome: Not Progressing  Goal: Hemodynamic stability and optimal renal function maintained  Description: INTERVENTIONS:  - Monitor labs and assess for signs and symptoms of volume excess or deficit  - Monitor intake, output and patient weight  - Monitor urine specific gravity, serum osmolarity and serum sodium as indicated or ordered  - Monitor response to interventions for patient's volume status, including labs, urine output, blood pressure (other measures as available)  - Encourage oral intake as appropriate  - Instruct patient on fluid and nutrition restrictions as appropriate  11/25/2023 0950 by Luisa Cruz RN  Outcome: Progressing  11/25/2023 0949 by Luisa Cruz RN  Outcome: Progressing     Problem: SKIN/TISSUE INTEGRITY - ADULT  Goal: Skin integrity remains intact  Description: INTERVENTIONS  - Assess and document risk factors for pressure ulcer development  - Assess and document skin integrity  - Monitor for areas of redness and/or skin breakdown  - Initiate interventions, skin care algorithm/standards of care as needed  11/25/2023 0950 by Luisa Cruz RN  Outcome: Progressing  11/25/2023 0949 by Luisa Cruz RN  Outcome: Progressing     Problem: Safety Risk - Non-Violent Restraints  Goal: Patient will remain free from self-harm  Description: INTERVENTIONS:  - Apply the least restrictive restraint to prevent harm  - Notify patient and family of reasons restraints applied  - Assess for any contributing factors to confusion (electrolyte disturbances, delirium, medications)  - Discontinue any unnecessary medical devices as soon as possible  - Assess the patient's physical comfort, circulation, skin condition, hydration, nutrition and elimination needs   - Reorient and redirection as needed  - Assess for the need to continue restraints  11/25/2023 0950 by Dylon Constantino, RN  Outcome: Progressing  11/25/2023 0949 by Dylon Constantino RN  Outcome: Progressing

## 2023-11-26 LAB
ANION GAP SERPL CALC-SCNC: 5 MMOL/L (ref 0–18)
ANION GAP SERPL CALC-SCNC: 7 MMOL/L (ref 0–18)
BASOPHILS # BLD: 0 X10(3) UL (ref 0–0.2)
BASOPHILS NFR BLD: 0 %
BUN BLD-MCNC: 108 MG/DL (ref 9–23)
BUN BLD-MCNC: 110 MG/DL (ref 9–23)
CALCIUM BLD-MCNC: 8.1 MG/DL (ref 8.5–10.1)
CALCIUM BLD-MCNC: 8.6 MG/DL (ref 8.5–10.1)
CHLORIDE SERPL-SCNC: 121 MMOL/L (ref 98–112)
CHLORIDE SERPL-SCNC: 121 MMOL/L (ref 98–112)
CO2 SERPL-SCNC: 23 MMOL/L (ref 21–32)
CO2 SERPL-SCNC: 25 MMOL/L (ref 21–32)
CREAT BLD-MCNC: 3.2 MG/DL
CREAT BLD-MCNC: 3.24 MG/DL
EGFRCR SERPLBLD CKD-EPI 2021: 17 ML/MIN/1.73M2 (ref 60–?)
EGFRCR SERPLBLD CKD-EPI 2021: 18 ML/MIN/1.73M2 (ref 60–?)
EOSINOPHIL # BLD: 0 X10(3) UL (ref 0–0.7)
EOSINOPHIL NFR BLD: 0 %
ERYTHROCYTE [DISTWIDTH] IN BLOOD BY AUTOMATED COUNT: 14.6 %
GLUCOSE BLD-MCNC: 137 MG/DL (ref 70–99)
GLUCOSE BLD-MCNC: 227 MG/DL (ref 70–99)
GLUCOSE BLD-MCNC: 235 MG/DL (ref 70–99)
GLUCOSE BLD-MCNC: 248 MG/DL (ref 70–99)
GLUCOSE BLD-MCNC: 261 MG/DL (ref 70–99)
GLUCOSE BLD-MCNC: 292 MG/DL (ref 70–99)
GLUCOSE BLD-MCNC: 317 MG/DL (ref 70–99)
HCT VFR BLD AUTO: 30.2 %
HGB BLD-MCNC: 9.6 G/DL
LYMPHOCYTES NFR BLD: 1.22 X10(3) UL (ref 1–4)
LYMPHOCYTES NFR BLD: 9 %
MCH RBC QN AUTO: 31.7 PG (ref 26–34)
MCHC RBC AUTO-ENTMCNC: 31.8 G/DL (ref 31–37)
MCV RBC AUTO: 99.7 FL
MONOCYTES # BLD: 0.14 X10(3) UL (ref 0.1–1)
MONOCYTES NFR BLD: 1 %
MORPHOLOGY: NORMAL
NEUTROPHILS # BLD AUTO: 12.38 X10 (3) UL (ref 1.5–7.7)
NEUTROPHILS NFR BLD: 78 %
NEUTS BAND NFR BLD: 12 %
NEUTS HYPERSEG # BLD: 12.15 X10(3) UL (ref 1.5–7.7)
OSMOLALITY SERPL CALC.SUM OF ELEC: 354 MOSM/KG (ref 275–295)
OSMOLALITY SERPL CALC.SUM OF ELEC: 358 MOSM/KG (ref 275–295)
PLATELET # BLD AUTO: 63 10(3)UL (ref 150–450)
PLATELET MORPHOLOGY: NORMAL
POTASSIUM SERPL-SCNC: 3.4 MMOL/L (ref 3.5–5.1)
POTASSIUM SERPL-SCNC: 3.7 MMOL/L (ref 3.5–5.1)
RBC # BLD AUTO: 3.03 X10(6)UL
SODIUM SERPL-SCNC: 151 MMOL/L (ref 136–145)
SODIUM SERPL-SCNC: 151 MMOL/L (ref 136–145)
TOTAL CELLS COUNTED BLD: 100
WBC # BLD AUTO: 13.5 X10(3) UL (ref 4–11)

## 2023-11-26 RX ORDER — POTASSIUM CHLORIDE 1.5 G/1.58G
20 POWDER, FOR SOLUTION ORAL ONCE
Status: COMPLETED | OUTPATIENT
Start: 2023-11-26 | End: 2023-11-26

## 2023-11-26 RX ORDER — BISACODYL 10 MG
10 SUPPOSITORY, RECTAL RECTAL
Status: DISCONTINUED | OUTPATIENT
Start: 2023-11-26 | End: 2023-12-08

## 2023-11-26 NOTE — PLAN OF CARE
Assumed care of pt at 1900, pt is lethargic and non verbal. Responds to pain. left lung diminished right course. Pt with occasional wet cough non productive. Remains on tube feeds tolerating well. Dubhoff at 55cm to right nare. Restraints in place. Sepsis BPA fired and  paged no new orders received pt remains on iv antibx. Pt is afebrile. Will continue to monitor.

## 2023-11-26 NOTE — PLAN OF CARE
----- Message from Anoop Jones MD sent at 3/7/2022  5:54 PM CST -----  Let her know stress test was negative   NURSING NOTES:  0730: Pt received not opening eyes, non-verbal, not following commands, responds to pain with minimal movement of hands and foot. Mittens restraints removed. Room air. SR w/PAC. R nare Dobhoff. TF at 45 ml/hr. IVF at 125 ml/hr. Primofit. SCD. 1000: No BM documented since admission. Dr. Crockett Pod ordered Dulcolax supp. 1615: Notified Dr. Crockett Pod with temp 100.5. Will give Tylenol. Pt had a large BM. Problem: Diabetes/Glucose Control  Goal: Glucose maintained within prescribed range  Description: INTERVENTIONS:  - Monitor Blood Glucose as ordered  - Assess for signs and symptoms of hyperglycemia and hypoglycemia  - Administer ordered medications to maintain glucose within target range  - Assess barriers to adequate nutritional intake and initiate nutrition consult as needed  - Instruct patient on self management of diabetes  Outcome: Progressing     Problem: CARDIOVASCULAR - ADULT  Goal: Maintains optimal cardiac output and hemodynamic stability  Description: INTERVENTIONS:  - Monitor vital signs, rhythm, and trends  - Monitor for bleeding, hypotension and signs of decreased cardiac output  - Evaluate effectiveness of vasoactive medications to optimize hemodynamic stability  - Monitor arterial and/or venous puncture sites for bleeding and/or hematoma  - Assess quality of pulses, skin color and temperature  - Assess for signs of decreased coronary artery perfusion - ex.  Angina  - Evaluate fluid balance, assess for edema, trend weights  Outcome: Progressing     Problem: METABOLIC/FLUID AND ELECTROLYTES - ADULT  Goal: Glucose maintained within prescribed range  Description: INTERVENTIONS:  - Monitor Blood Glucose as ordered  - Assess for signs and symptoms of hyperglycemia and hypoglycemia  - Administer ordered medications to maintain glucose within target range  - Assess barriers to adequate nutritional intake and initiate nutrition consult as needed  - Instruct patient on self management of diabetes  Outcome: Progressing  Goal: Electrolytes maintained within normal limits  Description: INTERVENTIONS:  - Monitor labs and rhythm and assess patient for signs and symptoms of electrolyte imbalances  - Administer electrolyte replacement as ordered  - Monitor response to electrolyte replacements, including rhythm and repeat lab results as appropriate  - Fluid restriction as ordered  - Instruct patient on fluid and nutrition restrictions as appropriate  Outcome: Progressing  Goal: Hemodynamic stability and optimal renal function maintained  Description: INTERVENTIONS:  - Monitor labs and assess for signs and symptoms of volume excess or deficit  - Monitor intake, output and patient weight  - Monitor urine specific gravity, serum osmolarity and serum sodium as indicated or ordered  - Monitor response to interventions for patient's volume status, including labs, urine output, blood pressure (other measures as available)  - Encourage oral intake as appropriate  - Instruct patient on fluid and nutrition restrictions as appropriate  Outcome: Progressing

## 2023-11-26 NOTE — CM/SW NOTE
11/26/23 1100   CM/SW Referral Data   Referral Source Social Work (self-referral)   Reason for Referral Discharge planning   Patient [address]   Post Acute Care Provider Upon Admission Bob Tellez     Pt admits from [facility]. Progress notes sent to [facility] in [facility] referral system. Pasrr screen not needed as pt is LTC resident. SW/CM to remain available.     ISABELLE Payan  Discharge [facility]

## 2023-11-26 NOTE — SLP NOTE
3rd attempt since admission for BSE as per protocol, however, patient continues to remain obtunded and not able to participate. DHT in place. (Noted baseline diet is puree diet texture with thin liquids). Will con't to follow and re-attempt when patient able to participate. D/W RN.    Ly Morse, MS CCC-SLP/L, pager [phone]  Speech-LanguagePathologist

## 2023-11-27 LAB
ANION GAP SERPL CALC-SCNC: 8 MMOL/L (ref 0–18)
BASOPHILS # BLD AUTO: 0.01 X10(3) UL (ref 0–0.2)
BASOPHILS NFR BLD AUTO: 0.1 %
BUN BLD-MCNC: 111 MG/DL (ref 9–23)
CALCIUM BLD-MCNC: 8.7 MG/DL (ref 8.5–10.1)
CHLORIDE SERPL-SCNC: 120 MMOL/L (ref 98–112)
CO2 SERPL-SCNC: 23 MMOL/L (ref 21–32)
CREAT BLD-MCNC: 3.22 MG/DL
EGFRCR SERPLBLD CKD-EPI 2021: 18 ML/MIN/1.73M2 (ref 60–?)
EOSINOPHIL # BLD AUTO: 0.02 X10(3) UL (ref 0–0.7)
EOSINOPHIL NFR BLD AUTO: 0.2 %
ERYTHROCYTE [DISTWIDTH] IN BLOOD BY AUTOMATED COUNT: 14.4 %
GLUCOSE BLD-MCNC: 281 MG/DL (ref 70–99)
GLUCOSE BLD-MCNC: 293 MG/DL (ref 70–99)
GLUCOSE BLD-MCNC: 388 MG/DL (ref 70–99)
GLUCOSE BLD-MCNC: 410 MG/DL (ref 70–99)
HCT VFR BLD AUTO: 30.9 %
HGB BLD-MCNC: 9.9 G/DL
IMM GRANULOCYTES # BLD AUTO: 0.05 X10(3) UL (ref 0–1)
IMM GRANULOCYTES NFR BLD: 0.4 %
LYMPHOCYTES # BLD AUTO: 1.02 X10(3) UL (ref 1–4)
LYMPHOCYTES NFR BLD AUTO: 8 %
MAGNESIUM SERPL-MCNC: 2.6 MG/DL (ref 1.6–2.6)
MCH RBC QN AUTO: 31.5 PG (ref 26–34)
MCHC RBC AUTO-ENTMCNC: 32 G/DL (ref 31–37)
MCV RBC AUTO: 98.4 FL
MONOCYTES # BLD AUTO: 0.14 X10(3) UL (ref 0.1–1)
MONOCYTES NFR BLD AUTO: 1.1 %
NEUTROPHILS # BLD AUTO: 11.5 X10 (3) UL (ref 1.5–7.7)
NEUTROPHILS # BLD AUTO: 11.5 X10(3) UL (ref 1.5–7.7)
NEUTROPHILS NFR BLD AUTO: 90.2 %
OSMOLALITY SERPL CALC.SUM OF ELEC: 357 MOSM/KG (ref 275–295)
PLATELET # BLD AUTO: 63 10(3)UL (ref 150–450)
POTASSIUM SERPL-SCNC: 3.7 MMOL/L (ref 3.5–5.1)
RBC # BLD AUTO: 3.14 X10(6)UL
SODIUM SERPL-SCNC: 151 MMOL/L (ref 136–145)
WBC # BLD AUTO: 12.7 X10(3) UL (ref 4–11)

## 2023-11-27 RX ORDER — CIPROFLOXACIN 2 MG/ML
400 INJECTION, SOLUTION INTRAVENOUS EVERY 24 HOURS
Status: DISCONTINUED | OUTPATIENT
Start: 2023-11-27 | End: 2023-12-08

## 2023-11-27 RX ORDER — CIPROFLOXACIN 2 MG/ML
400 INJECTION, SOLUTION INTRAVENOUS EVERY 12 HOURS
Status: DISCONTINUED | OUTPATIENT
Start: 2023-11-27 | End: 2023-11-27

## 2023-11-27 NOTE — PLAN OF CARE
Assumed care of pt around 1930. Pt lethargic, nonverbal, does not open eyes, or follow commands. Remains on room air. Diminished lung sounds. Nonproductive cough. NSR on tele with occasional PAC's, on subQ heparin. Does not appear distressed or in pain. Incontinent, external catheter in place. R Dobbhoff in place for feedings and meds. Tolerating turning side to side with assistance. Fall precautions in place. 0434: 10 beats VT, asymptomatic. MD notified, am mag ordered.     Plan: SLP eval, tube feedings, IV abx        Problem: Diabetes/Glucose Control  Goal: Glucose maintained within prescribed range  Description: INTERVENTIONS:  - Monitor Blood Glucose as ordered  - Assess for signs and symptoms of hyperglycemia and hypoglycemia  - Administer ordered medications to maintain glucose within target range  - Assess barriers to adequate nutritional intake and initiate nutrition consult as needed  - Instruct patient on self management of diabetes  Outcome: Progressing     Problem: Patient/Family Goals  Goal: Patient/Family Long Term Goal  Description: Patient's Long Term Goal: stay out of hospital    Interventions:  - med compliance, follow ups  - trend labs/sugars/vitals    - See additional Care Plan goals for specific interventions  Outcome: Progressing  Goal: Patient/Family Short Term Goal  Description: Patient's Short Term Goal: sugar control    Interventions:   - trend sugars Q6, tube feedings, insulin protocol, fluids    - See additional Care Plan goals for specific interventions  Outcome: Progressing     Problem: Delirium  Goal: Minimize duration of delirium  Description: Interventions:  - Encourage use of hearing aids, eye glasses  - Promote highest level of mobility daily  - Provide frequent reorientation  - Promote wakefulness i.e. lights on, blinds open  - Promote sleep, encourage patient's normal rest cycle i.e. lights off, TV off, minimize noise and interruptions  - Encourage family to assist in orientation and promotion of home routines  Outcome: Progressing     Problem: CARDIOVASCULAR - ADULT  Goal: Maintains optimal cardiac output and hemodynamic stability  Description: INTERVENTIONS:  - Monitor vital signs, rhythm, and trends  - Monitor for bleeding, hypotension and signs of decreased cardiac output  - Evaluate effectiveness of vasoactive medications to optimize hemodynamic stability  - Monitor arterial and/or venous puncture sites for bleeding and/or hematoma  - Assess quality of pulses, skin color and temperature  - Assess for signs of decreased coronary artery perfusion - ex.  Angina  - Evaluate fluid balance, assess for edema, trend weights  Outcome: Progressing     Problem: METABOLIC/FLUID AND ELECTROLYTES - ADULT  Goal: Glucose maintained within prescribed range  Description: INTERVENTIONS:  - Monitor Blood Glucose as ordered  - Assess for signs and symptoms of hyperglycemia and hypoglycemia  - Administer ordered medications to maintain glucose within target range  - Assess barriers to adequate nutritional intake and initiate nutrition consult as needed  - Instruct patient on self management of diabetes  Outcome: Progressing  Goal: Electrolytes maintained within normal limits  Description: INTERVENTIONS:  - Monitor labs and rhythm and assess patient for signs and symptoms of electrolyte imbalances  - Administer electrolyte replacement as ordered  - Monitor response to electrolyte replacements, including rhythm and repeat lab results as appropriate  - Fluid restriction as ordered  - Instruct patient on fluid and nutrition restrictions as appropriate  Outcome: Progressing  Goal: Hemodynamic stability and optimal renal function maintained  Description: INTERVENTIONS:  - Monitor labs and assess for signs and symptoms of volume excess or deficit  - Monitor intake, output and patient weight  - Monitor urine specific gravity, serum osmolarity and serum sodium as indicated or ordered  - Monitor response to interventions for patient's volume status, including labs, urine output, blood pressure (other measures as available)  - Encourage oral intake as appropriate  - Instruct patient on fluid and nutrition restrictions as appropriate  Outcome: Progressing     Problem: SKIN/TISSUE INTEGRITY - ADULT  Goal: Skin integrity remains intact  Description: INTERVENTIONS  - Assess and document risk factors for pressure ulcer development  - Assess and document skin integrity  - Monitor for areas of redness and/or skin breakdown  - Initiate interventions, skin care algorithm/standards of care as needed  Outcome: Progressing     Problem: Safety Risk - Non-Violent Restraints  Goal: Patient will remain free from self-harm  Description: INTERVENTIONS:  - Apply the least restrictive restraint to prevent harm  - Notify patient and family of reasons restraints applied  - Assess for any contributing factors to confusion (electrolyte disturbances, delirium, medications)  - Discontinue any unnecessary medical devices as soon as possible  - Assess the patient's physical comfort, circulation, skin condition, hydration, nutrition and elimination needs   - Reorient and redirection as needed  - Assess for the need to continue restraints  Outcome: Progressing

## 2023-11-27 NOTE — CM/SW NOTE
Sent updated clinicals to [facility] LUIGI GEIGER in [place]. SW/CM to remain available for support and/or discharge planning.     ISABELLE Bertrand  Discharge Planner  [phone]

## 2023-11-27 NOTE — PROGRESS NOTES
[facility] Pharmacy Note:  Renal Adjustment for ciprofloxacin (CIPRO)    Lili Cheema is a 80year old patient who has been prescribed ciprofloxacin (CIPRO) 400 mg every 12 hrs. The estimated creatinine clearance is 10.3 mL/min (A) (based on SCr of 3.22 mg/dL (H)). The dose has been adjusted to ciprofloxacin (CIPRO) 400 mg every 24 hrs per hospital renal dose adjustment protocol for treatment of bacteremia. Pharmacy will follow and adjust dose as warranted for additional renal function changes.     Thank you,    Arun Lanier, PharmD, BCPS, [address]  Pharmacist Specialist - [address] [facility]  Phone: [phone]

## 2023-11-27 NOTE — PLAN OF CARE
Assumed pt care at 0730. Pt lethargic, opens eyes briefly to voice, nonverbal, unable to assess orientation. Room air, pt does not appear to be in any distress, vital signs stable, NSR on tele. Incontinent of bowel and bladder. Total assist. NG tube in place, tube feedings running at goal rate. Plan of care: IV abx, continuous TF     Plan of care updated with patient and son at bedside. Call light is within reach, all needs met at this time.     Problem: Diabetes/Glucose Control  Goal: Glucose maintained within prescribed range  Description: INTERVENTIONS:  - Monitor Blood Glucose as ordered  - Assess for signs and symptoms of hyperglycemia and hypoglycemia  - Administer ordered medications to maintain glucose within target range  - Assess barriers to adequate nutritional intake and initiate nutrition consult as needed  - Instruct patient on self management of diabetes  Outcome: Progressing     Problem: Patient/Family Goals  Goal: Patient/Family Long Term Goal  Description: Patient's Long Term Goal: stay out of hospital 11-25    Interventions:  - med compliance, follow ups  - trend labs/sugars/vitals    - See additional Care Plan goals for specific interventions  Outcome: Progressing  Goal: Patient/Family Short Term Goal  Description: Patient's Short Term Goal: sugar control 11-25    Interventions:   - trend sugars Q6, tube feedings, insulin protocol, fluids    - See additional Care Plan goals for specific interventions  Outcome: Progressing     Problem: Delirium  Goal: Minimize duration of delirium  Description: Interventions:  - Encourage use of hearing aids, eye glasses  - Promote highest level of mobility daily  - Provide frequent reorientation  - Promote wakefulness i.e. lights on, blinds open  - Promote sleep, encourage patient's normal rest cycle i.e. lights off, TV off, minimize noise and interruptions  - Encourage family to assist in orientation and promotion of home routines  Outcome: Progressing Problem: CARDIOVASCULAR - ADULT  Goal: Maintains optimal cardiac output and hemodynamic stability  Description: INTERVENTIONS:  - Monitor vital signs, rhythm, and trends  - Monitor for bleeding, hypotension and signs of decreased cardiac output  - Evaluate effectiveness of vasoactive medications to optimize hemodynamic stability  - Monitor arterial and/or venous puncture sites for bleeding and/or hematoma  - Assess quality of pulses, skin color and temperature  - Assess for signs of decreased coronary artery perfusion - ex.  Angina  - Evaluate fluid balance, assess for edema, trend weights  Outcome: Progressing     Problem: METABOLIC/FLUID AND ELECTROLYTES - ADULT  Goal: Glucose maintained within prescribed range  Description: INTERVENTIONS:  - Monitor Blood Glucose as ordered  - Assess for signs and symptoms of hyperglycemia and hypoglycemia  - Administer ordered medications to maintain glucose within target range  - Assess barriers to adequate nutritional intake and initiate nutrition consult as needed  - Instruct patient on self management of diabetes  Outcome: Progressing  Goal: Electrolytes maintained within normal limits  Description: INTERVENTIONS:  - Monitor labs and rhythm and assess patient for signs and symptoms of electrolyte imbalances  - Administer electrolyte replacement as ordered  - Monitor response to electrolyte replacements, including rhythm and repeat lab results as appropriate  - Fluid restriction as ordered  - Instruct patient on fluid and nutrition restrictions as appropriate  Outcome: Progressing  Goal: Hemodynamic stability and optimal renal function maintained  Description: INTERVENTIONS:  - Monitor labs and assess for signs and symptoms of volume excess or deficit  - Monitor intake, output and patient weight  - Monitor urine specific gravity, serum osmolarity and serum sodium as indicated or ordered  - Monitor response to interventions for patient's volume status, including labs, urine output, blood pressure (other measures as available)  - Encourage oral intake as appropriate  - Instruct patient on fluid and nutrition restrictions as appropriate  Outcome: Progressing

## 2023-11-28 LAB
ANION GAP SERPL CALC-SCNC: 8 MMOL/L (ref 0–18)
BASOPHILS # BLD AUTO: 0.01 X10(3) UL (ref 0–0.2)
BASOPHILS NFR BLD AUTO: 0.1 %
BUN BLD-MCNC: 113 MG/DL (ref 9–23)
CALCIUM BLD-MCNC: 8.8 MG/DL (ref 8.5–10.1)
CHLORIDE SERPL-SCNC: 117 MMOL/L (ref 98–112)
CO2 SERPL-SCNC: 26 MMOL/L (ref 21–32)
CREAT BLD-MCNC: 3.06 MG/DL
EGFRCR SERPLBLD CKD-EPI 2021: 19 ML/MIN/1.73M2 (ref 60–?)
EOSINOPHIL # BLD AUTO: 0.07 X10(3) UL (ref 0–0.7)
EOSINOPHIL NFR BLD AUTO: 0.6 %
ERYTHROCYTE [DISTWIDTH] IN BLOOD BY AUTOMATED COUNT: 14.3 %
GLUCOSE BLD-MCNC: 185 MG/DL (ref 70–99)
GLUCOSE BLD-MCNC: 186 MG/DL (ref 70–99)
GLUCOSE BLD-MCNC: 246 MG/DL (ref 70–99)
GLUCOSE BLD-MCNC: 266 MG/DL (ref 70–99)
GLUCOSE BLD-MCNC: 298 MG/DL (ref 70–99)
HCT VFR BLD AUTO: 27.7 %
HGB BLD-MCNC: 9.3 G/DL
IMM GRANULOCYTES # BLD AUTO: 0.09 X10(3) UL (ref 0–1)
IMM GRANULOCYTES NFR BLD: 0.8 %
LYMPHOCYTES # BLD AUTO: 0.73 X10(3) UL (ref 1–4)
LYMPHOCYTES NFR BLD AUTO: 6.2 %
MCH RBC QN AUTO: 31.5 PG (ref 26–34)
MCHC RBC AUTO-ENTMCNC: 33.6 G/DL (ref 31–37)
MCV RBC AUTO: 93.9 FL
MONOCYTES # BLD AUTO: 0.18 X10(3) UL (ref 0.1–1)
MONOCYTES NFR BLD AUTO: 1.5 %
NEUTROPHILS # BLD AUTO: 10.7 X10 (3) UL (ref 1.5–7.7)
NEUTROPHILS # BLD AUTO: 10.7 X10(3) UL (ref 1.5–7.7)
NEUTROPHILS NFR BLD AUTO: 90.8 %
OSMOLALITY SERPL CALC.SUM OF ELEC: 353 MOSM/KG (ref 275–295)
PLATELET # BLD AUTO: 75 10(3)UL (ref 150–450)
POTASSIUM SERPL-SCNC: 3.6 MMOL/L (ref 3.5–5.1)
RBC # BLD AUTO: 2.95 X10(6)UL
SODIUM SERPL-SCNC: 151 MMOL/L (ref 136–145)
WBC # BLD AUTO: 11.8 X10(3) UL (ref 4–11)

## 2023-11-28 NOTE — PLAN OF CARE
Pt. lethargic and non-verbal. Arousable to verbal and tactile stimuli. Able to open eyes and withdraw from pain. On RA. Tachypneic. SR w/ BBB & T wave inversion in tele. NPO. On TF Jevity 1.5 at the goal rate of 45ml/hr w/ Q 4hrs water flush of 250ml. Accucheck Q 6hrs. W/ coverage. Dependent w/ care. Repositioned. Had a temp of 100.7. Tylenol given. Re-checked Temp of 99.2. Bed alarm on.        Problem: Diabetes/Glucose Control  Goal: Glucose maintained within prescribed range  Description: INTERVENTIONS:  - Monitor Blood Glucose as ordered  - Assess for signs and symptoms of hyperglycemia and hypoglycemia  - Administer ordered medications to maintain glucose within target range  - Assess barriers to adequate nutritional intake and initiate nutrition consult as needed  - Instruct patient on self management of diabetes  Outcome: Progressing     Problem: Patient/Family Goals  Goal: Patient/Family Long Term Goal  Description: Patient's Long Term Goal: stay out of hospital 11-25    Interventions:  - med compliance, follow ups  - trend labs/sugars/vitals    - See additional Care Plan goals for specific interventions  Outcome: Progressing  Goal: Patient/Family Short Term Goal  Description: Patient's Short Term Goal: sugar control 11-25    Interventions:   - trend sugars Q6, tube feedings, insulin protocol, fluids    - See additional Care Plan goals for specific interventions  Outcome: Progressing     Problem: Delirium  Goal: Minimize duration of delirium  Description: Interventions:  - Encourage use of hearing aids, eye glasses  - Promote highest level of mobility daily  - Provide frequent reorientation  - Promote wakefulness i.e. lights on, blinds open  - Promote sleep, encourage patient's normal rest cycle i.e. lights off, TV off, minimize noise and interruptions  - Encourage family to assist in orientation and promotion of home routines  Outcome: Progressing     Problem: CARDIOVASCULAR - ADULT  Goal: Maintains optimal cardiac output and hemodynamic stability  Description: INTERVENTIONS:  - Monitor vital signs, rhythm, and trends  - Monitor for bleeding, hypotension and signs of decreased cardiac output  - Evaluate effectiveness of vasoactive medications to optimize hemodynamic stability  - Monitor arterial and/or venous puncture sites for bleeding and/or hematoma  - Assess quality of pulses, skin color and temperature  - Assess for signs of decreased coronary artery perfusion - ex.  Angina  - Evaluate fluid balance, assess for edema, trend weights  Outcome: Progressing     Problem: METABOLIC/FLUID AND ELECTROLYTES - ADULT  Goal: Glucose maintained within prescribed range  Description: INTERVENTIONS:  - Monitor Blood Glucose as ordered  - Assess for signs and symptoms of hyperglycemia and hypoglycemia  - Administer ordered medications to maintain glucose within target range  - Assess barriers to adequate nutritional intake and initiate nutrition consult as needed  - Instruct patient on self management of diabetes  Outcome: Progressing  Goal: Electrolytes maintained within normal limits  Description: INTERVENTIONS:  - Monitor Blood Glucose as ordered  - Assess for signs and symptoms of hyperglycemia and hypoglycemia  - Administer ordered medications to maintain glucose within target range  - Assess barriers to adequate nutritional intake and initiate nutrition consult as needed  - Instruct patient on self management of diabetes  Outcome: Progressing  Goal: Hemodynamic stability and optimal renal function maintained  Description: INTERVENTIONS:  - Monitor labs and rhythm and assess patient for signs and symptoms of electrolyte imbalances  - Administer electrolyte replacement as ordered  - Monitor response to electrolyte replacements, including rhythm and repeat lab results as appropriate  - Fluid restriction as ordered  - Instruct patient on fluid and nutrition restrictions as appropriate  Outcome: Progressing     Problem: SKIN/TISSUE INTEGRITY - ADULT  Goal: Skin integrity remains intact  Description: INTERVENTIONS  - Assess and document risk factors for pressure ulcer development  - Assess and document skin integrity  - Monitor for areas of redness and/or skin breakdown  - Initiate interventions, skin care algorithm/standards of care as needed  Outcome: Progressing     Problem: Safety Risk - Non-Violent Restraints  Goal: Patient will remain free from self-harm  Description: INTERVENTIONS:  - Apply the least restrictive restraint to prevent harm  - Notify patient and family of reasons restraints applied  - Assess for any contributing factors to confusion (electrolyte disturbances, delirium, medications)  - Discontinue any unnecessary medical devices as soon as possible  - Assess the patient's physical comfort, circulation, skin condition, hydration, nutrition and elimination needs   - Reorient and redirection as needed  - Assess for the need to continue restraints  Outcome: Progressing

## 2023-11-28 NOTE — PLAN OF CARE
Assumed pt care at 0730. Pt lethargic, opens eyes briefly to voice, nonverbal, unable to assess orientation. Room air, pt does not appear to be in any distress, vital signs stable, NSR on tele. Incontinent of bowel and bladder. Total assist. NG tube in place, tube feedings running at goal rate. Plan of care: IV abx, continuous TF      Plan of care updated with family at bedside. Call light is within reach, all needs met at this time.     Problem: Diabetes/Glucose Control  Goal: Glucose maintained within prescribed range  Description: INTERVENTIONS:  - Monitor Blood Glucose as ordered  - Assess for signs and symptoms of hyperglycemia and hypoglycemia  - Administer ordered medications to maintain glucose within target range  - Assess barriers to adequate nutritional intake and initiate nutrition consult as needed  - Instruct patient on self management of diabetes  Outcome: Progressing     Problem: Patient/Family Goals  Goal: Patient/Family Long Term Goal  Description: Patient's Long Term Goal: stay out of hospital 11-25    Interventions:  - med compliance, follow ups  - trend labs/sugars/vitals    - See additional Care Plan goals for specific interventions  Outcome: Progressing  Goal: Patient/Family Short Term Goal  Description: Patient's Short Term Goal: sugar control 11-25    Interventions:   - trend sugars Q6, tube feedings, insulin protocol, fluids    - See additional Care Plan goals for specific interventions  Outcome: Progressing     Problem: Delirium  Goal: Minimize duration of delirium  Description: Interventions:  - Encourage use of hearing aids, eye glasses  - Promote highest level of mobility daily  - Provide frequent reorientation  - Promote wakefulness i.e. lights on, blinds open  - Promote sleep, encourage patient's normal rest cycle i.e. lights off, TV off, minimize noise and interruptions  - Encourage family to assist in orientation and promotion of home routines  Outcome: Progressing     Problem: CARDIOVASCULAR - ADULT  Goal: Maintains optimal cardiac output and hemodynamic stability  Description: INTERVENTIONS:  - Monitor vital signs, rhythm, and trends  - Monitor for bleeding, hypotension and signs of decreased cardiac output  - Evaluate effectiveness of vasoactive medications to optimize hemodynamic stability  - Monitor arterial and/or venous puncture sites for bleeding and/or hematoma  - Assess quality of pulses, skin color and temperature  - Assess for signs of decreased coronary artery perfusion - ex.  Angina  - Evaluate fluid balance, assess for edema, trend weights  Outcome: Progressing     Problem: METABOLIC/FLUID AND ELECTROLYTES - ADULT  Goal: Glucose maintained within prescribed range  Description: INTERVENTIONS:  - Monitor Blood Glucose as ordered  - Assess for signs and symptoms of hyperglycemia and hypoglycemia  - Administer ordered medications to maintain glucose within target range  - Assess barriers to adequate nutritional intake and initiate nutrition consult as needed  - Instruct patient on self management of diabetes  Outcome: Progressing  Goal: Electrolytes maintained within normal limits  Description: INTERVENTIONS:  - Monitor labs and rhythm and assess patient for signs and symptoms of electrolyte imbalances  - Administer electrolyte replacement as ordered  - Monitor response to electrolyte replacements, including rhythm and repeat lab results as appropriate  - Fluid restriction as ordered  - Instruct patient on fluid and nutrition restrictions as appropriate  Outcome: Progressing  Goal: Hemodynamic stability and optimal renal function maintained  Description: INTERVENTIONS:  - Monitor labs and assess for signs and symptoms of volume excess or deficit  - Monitor intake, output and patient weight  - Monitor urine specific gravity, serum osmolarity and serum sodium as indicated or ordered  - Monitor response to interventions for patient's volume status, including labs, urine output, blood pressure (other measures as available)  - Encourage oral intake as appropriate  - Instruct patient on fluid and nutrition restrictions as appropriate  Outcome: Progressing

## 2023-11-29 PROBLEM — Z71.89 GOALS OF CARE, COUNSELING/DISCUSSION: Status: ACTIVE | Noted: 2023-11-29

## 2023-11-29 PROBLEM — Z51.5 PALLIATIVE CARE ENCOUNTER: Status: ACTIVE | Noted: 2023-11-29

## 2023-11-29 LAB
ANION GAP SERPL CALC-SCNC: 7 MMOL/L (ref 0–18)
BASOPHILS # BLD AUTO: 0.01 X10(3) UL (ref 0–0.2)
BASOPHILS NFR BLD AUTO: 0.1 %
BUN BLD-MCNC: 108 MG/DL (ref 9–23)
CALCIUM BLD-MCNC: 9.2 MG/DL (ref 8.5–10.1)
CHLORIDE SERPL-SCNC: 116 MMOL/L (ref 98–112)
CO2 SERPL-SCNC: 27 MMOL/L (ref 21–32)
CREAT BLD-MCNC: 2.93 MG/DL
EGFRCR SERPLBLD CKD-EPI 2021: 20 ML/MIN/1.73M2 (ref 60–?)
EOSINOPHIL # BLD AUTO: 0.07 X10(3) UL (ref 0–0.7)
EOSINOPHIL NFR BLD AUTO: 0.5 %
ERYTHROCYTE [DISTWIDTH] IN BLOOD BY AUTOMATED COUNT: 14.1 %
GLUCOSE BLD-MCNC: 164 MG/DL (ref 70–99)
GLUCOSE BLD-MCNC: 194 MG/DL (ref 70–99)
GLUCOSE BLD-MCNC: 215 MG/DL (ref 70–99)
GLUCOSE BLD-MCNC: 218 MG/DL (ref 70–99)
GLUCOSE BLD-MCNC: 239 MG/DL (ref 70–99)
GLUCOSE BLD-MCNC: 53 MG/DL (ref 70–99)
GLUCOSE BLD-MCNC: 66 MG/DL (ref 70–99)
GLUCOSE BLD-MCNC: 88 MG/DL (ref 70–99)
HCT VFR BLD AUTO: 27.6 %
HGB BLD-MCNC: 8.9 G/DL
IMM GRANULOCYTES # BLD AUTO: 0.08 X10(3) UL (ref 0–1)
IMM GRANULOCYTES NFR BLD: 0.6 %
LYMPHOCYTES # BLD AUTO: 0.68 X10(3) UL (ref 1–4)
LYMPHOCYTES NFR BLD AUTO: 5.3 %
MCH RBC QN AUTO: 31.1 PG (ref 26–34)
MCHC RBC AUTO-ENTMCNC: 32.2 G/DL (ref 31–37)
MCV RBC AUTO: 96.5 FL
MONOCYTES # BLD AUTO: 0.31 X10(3) UL (ref 0.1–1)
MONOCYTES NFR BLD AUTO: 2.4 %
NEUTROPHILS # BLD AUTO: 11.8 X10 (3) UL (ref 1.5–7.7)
NEUTROPHILS # BLD AUTO: 11.8 X10(3) UL (ref 1.5–7.7)
NEUTROPHILS NFR BLD AUTO: 91.1 %
OSMOLALITY SERPL CALC.SUM OF ELEC: 342 MOSM/KG (ref 275–295)
PLATELET # BLD AUTO: 100 10(3)UL (ref 150–450)
POTASSIUM SERPL-SCNC: 3.5 MMOL/L (ref 3.5–5.1)
RBC # BLD AUTO: 2.86 X10(6)UL
SODIUM SERPL-SCNC: 150 MMOL/L (ref 136–145)
WBC # BLD AUTO: 13 X10(3) UL (ref 4–11)

## 2023-11-29 PROCEDURE — 99497 ADVNCD CARE PLAN 30 MIN: CPT | Performed by: CLINICAL NURSE SPECIALIST

## 2023-11-29 PROCEDURE — 99221 1ST HOSP IP/OBS SF/LOW 40: CPT | Performed by: CLINICAL NURSE SPECIALIST

## 2023-11-29 NOTE — PLAN OF CARE
Assumed care of pt around 1930. Pt lethargic, opens eyes briefly to pain stimuli, unable to assess orientation. Pt non verbal at baseline. No indications of any pain or shortness of breath. On room air, NSR on tele. Abdomen flat, non tender, LBM 11/26. Pt with NG tube in with continuous tube feeds running. Increased rate to goal rate for feeds. Bed locked and in lowest position, call light within reach. POC: daily weight, continue IV antibiotics, NPO with continuous tube feeds running. Pt updated, all questions answered, verbalized understanding.     Problem: Diabetes/Glucose Control  Goal: Glucose maintained within prescribed range  Description: INTERVENTIONS:  - Monitor Blood Glucose as ordered  - Assess for signs and symptoms of hyperglycemia and hypoglycemia  - Administer ordered medications to maintain glucose within target range  - Assess barriers to adequate nutritional intake and initiate nutrition consult as needed  - Instruct patient on self management of diabetes  Outcome: Progressing     Problem: Patient/Family Goals  Goal: Patient/Family Long Term Goal  Description: Patient's Long Term Goal: stay out of hospital 11-25    Interventions:  - med compliance, follow ups  - trend labs/sugars/vitals    - See additional Care Plan goals for specific interventions  Outcome: Progressing  Goal: Patient/Family Short Term Goal  Description: Patient's Short Term Goal: sugar control 11-25    Interventions:   - trend sugars Q6, tube feedings, insulin protocol, fluids    - See additional Care Plan goals for specific interventions  Outcome: Progressing     Problem: Delirium  Goal: Minimize duration of delirium  Description: Interventions:  - Encourage use of hearing aids, eye glasses  - Promote highest level of mobility daily  - Provide frequent reorientation  - Promote wakefulness i.e. lights on, blinds open  - Promote sleep, encourage patient's normal rest cycle i.e. lights off, TV off, minimize noise and interruptions  - Encourage family to assist in orientation and promotion of home routines  Outcome: Progressing     Problem: CARDIOVASCULAR - ADULT  Goal: Maintains optimal cardiac output and hemodynamic stability  Description: INTERVENTIONS:  - Monitor vital signs, rhythm, and trends  - Monitor for bleeding, hypotension and signs of decreased cardiac output  - Evaluate effectiveness of vasoactive medications to optimize hemodynamic stability  - Monitor arterial and/or venous puncture sites for bleeding and/or hematoma  - Assess quality of pulses, skin color and temperature  - Assess for signs of decreased coronary artery perfusion - ex.  Angina  - Evaluate fluid balance, assess for edema, trend weights  Outcome: Progressing     Problem: METABOLIC/FLUID AND ELECTROLYTES - ADULT  Goal: Glucose maintained within prescribed range  Description: INTERVENTIONS:  - Monitor Blood Glucose as ordered  - Assess for signs and symptoms of hyperglycemia and hypoglycemia  - Administer ordered medications to maintain glucose within target range  - Assess barriers to adequate nutritional intake and initiate nutrition consult as needed  - Instruct patient on self management of diabetes  Outcome: Progressing  Goal: Electrolytes maintained within normal limits  Description: INTERVENTIONS:  - Monitor labs and rhythm and assess patient for signs and symptoms of electrolyte imbalances  - Administer electrolyte replacement as ordered  - Monitor response to electrolyte replacements, including rhythm and repeat lab results as appropriate  - Fluid restriction as ordered  - Instruct patient on fluid and nutrition restrictions as appropriate  Outcome: Progressing  Goal: Hemodynamic stability and optimal renal function maintained  Description: INTERVENTIONS:  - Monitor labs and assess for signs and symptoms of volume excess or deficit  - Monitor intake, output and patient weight  - Monitor urine specific gravity, serum osmolarity and serum sodium as indicated or ordered  - Monitor response to interventions for patient's volume status, including labs, urine output, blood pressure (other measures as available)  - Encourage oral intake as appropriate  - Instruct patient on fluid and nutrition restrictions as appropriate  Outcome: Progressing     Problem: SKIN/TISSUE INTEGRITY - ADULT  Goal: Skin integrity remains intact  Description: INTERVENTIONS  - Assess and document risk factors for pressure ulcer development  - Assess and document skin integrity  - Monitor for areas of redness and/or skin breakdown  - Initiate interventions, skin care algorithm/standards of care as needed  Outcome: Progressing     Problem: Safety Risk - Non-Violent Restraints  Goal: Patient will remain free from self-harm  Description: INTERVENTIONS:  - Apply the least restrictive restraint to prevent harm  - Notify patient and family of reasons restraints applied  - Assess for any contributing factors to confusion (electrolyte disturbances, delirium, medications)  - Discontinue any unnecessary medical devices as soon as possible  - Assess the patient's physical comfort, circulation, skin condition, hydration, nutrition and elimination needs   - Reorient and redirection as needed  - Assess for the need to continue restraints  Outcome: Progressing

## 2023-11-29 NOTE — PLAN OF CARE
Rec'd pt at 0730. Pt is lethargic, opens eyes to painful stimuli. KYLAH alertness, nonverbal at baseline. Tele shows NSR. O2 sats adequate on RA. Pt incontinent, male purewick in place. Bed locked and in low position, call light and personal items within reach. Will continue to monitor. POC - NPO w/ tube feeds running, IV abx.     Problem: Diabetes/Glucose Control  Goal: Glucose maintained within prescribed range  Description: INTERVENTIONS:  - Monitor Blood Glucose as ordered  - Assess for signs and symptoms of hyperglycemia and hypoglycemia  - Administer ordered medications to maintain glucose within target range  - Assess barriers to adequate nutritional intake and initiate nutrition consult as needed  - Instruct patient on self management of diabetes  11/29/2023 1114 by Ani Bartlett RN  Outcome: Progressing  11/29/2023 1114 by Ani Bartlett RN  Outcome: Progressing     Problem: Patient/Family Goals  Goal: Patient/Family Long Term Goal  Description: Patient's Long Term Goal: stay out of hospital 11-25    Interventions:  - med compliance, follow ups  - trend labs/sugars/vitals    - See additional Care Plan goals for specific interventions  11/29/2023 1114 by Ani Bartlett RN  Outcome: Progressing  11/29/2023 1114 by Ani Bartlett RN  Outcome: Progressing  Goal: Patient/Family Short Term Goal  Description: Patient's Short Term Goal: sugar control 11-25    Interventions:   - trend sugars Q6, tube feedings, insulin protocol, fluids    - See additional Care Plan goals for specific interventions  11/29/2023 1114 by Ani Bartlett RN  Outcome: Progressing  11/29/2023 1114 by Ani Bartlett RN  Outcome: Progressing     Problem: Delirium  Goal: Minimize duration of delirium  Description: Interventions:  - Encourage use of hearing aids, eye glasses  - Promote highest level of mobility daily  - Provide frequent reorientation  - Promote wakefulness i.e. lights on, blinds open  - Promote sleep, encourage patient's normal rest cycle i.e. lights off, TV off, minimize noise and interruptions  - Encourage family to assist in orientation and promotion of home routines  11/29/2023 1114 by Gayathri Art RN  Outcome: Progressing  11/29/2023 1114 by Gayathri Art RN  Outcome: Progressing     Problem: CARDIOVASCULAR - ADULT  Goal: Maintains optimal cardiac output and hemodynamic stability  Description: INTERVENTIONS:  - Monitor vital signs, rhythm, and trends  - Monitor for bleeding, hypotension and signs of decreased cardiac output  - Evaluate effectiveness of vasoactive medications to optimize hemodynamic stability  - Monitor arterial and/or venous puncture sites for bleeding and/or hematoma  - Assess quality of pulses, skin color and temperature  - Assess for signs of decreased coronary artery perfusion - ex.  Angina  - Evaluate fluid balance, assess for edema, trend weights  11/29/2023 1114 by Gayathri Art RN  Outcome: Progressing  11/29/2023 1114 by Gayathri Art RN  Outcome: Progressing     Problem: METABOLIC/FLUID AND ELECTROLYTES - ADULT  Goal: Glucose maintained within prescribed range  Description: INTERVENTIONS:  - Monitor Blood Glucose as ordered  - Assess for signs and symptoms of hyperglycemia and hypoglycemia  - Administer ordered medications to maintain glucose within target range  - Assess barriers to adequate nutritional intake and initiate nutrition consult as needed  - Instruct patient on self management of diabetes  11/29/2023 1114 by Gayathri Art RN  Outcome: Progressing  11/29/2023 1114 by Gayathri Art RN  Outcome: Progressing  Goal: Electrolytes maintained within normal limits  Description: INTERVENTIONS:  - Monitor labs and rhythm and assess patient for signs and symptoms of electrolyte imbalances  - Administer electrolyte replacement as ordered  - Monitor response to electrolyte replacements, including rhythm and repeat lab results as appropriate  - Fluid restriction as ordered  - Instruct patient on fluid and nutrition restrictions as appropriate  11/29/2023 1114 by Rosalia Nguyen RN  Outcome: Progressing  11/29/2023 1114 by Rosalia Nguyen RN  Outcome: Progressing  Goal: Hemodynamic stability and optimal renal function maintained  Description: INTERVENTIONS:  - Monitor labs and assess for signs and symptoms of volume excess or deficit  - Monitor intake, output and patient weight  - Monitor urine specific gravity, serum osmolarity and serum sodium as indicated or ordered  - Monitor response to interventions for patient's volume status, including labs, urine output, blood pressure (other measures as available)  - Encourage oral intake as appropriate  - Instruct patient on fluid and nutrition restrictions as appropriate  11/29/2023 1114 by Rosalia Nguyen RN  Outcome: Progressing  11/29/2023 1114 by Rosalia Nguyen RN  Outcome: Progressing     Problem: SKIN/TISSUE INTEGRITY - ADULT  Goal: Skin integrity remains intact  Description: INTERVENTIONS  - Assess and document risk factors for pressure ulcer development  - Assess and document skin integrity  - Monitor for areas of redness and/or skin breakdown  - Initiate interventions, skin care algorithm/standards of care as needed  11/29/2023 1114 by Rosalia Nguyen RN  Outcome: Progressing  11/29/2023 1114 by Rosalia Nguyen RN  Outcome: Progressing     Problem: Safety Risk - Non-Violent Restraints  Goal: Patient will remain free from self-harm  Description: INTERVENTIONS:  - Apply the least restrictive restraint to prevent harm  - Notify patient and family of reasons restraints applied  - Assess for any contributing factors to confusion (electrolyte disturbances, delirium, medications)  - Discontinue any unnecessary medical devices as soon as possible  - Assess the patient's physical comfort, circulation, skin condition, hydration, nutrition and elimination needs   - Reorient and redirection as needed  - Assess for the need to continue restraints  11/29/2023 1114 by Rosalia Nguyen RN  Outcome: Progressing  11/29/2023 1114 by Rosalia Nguyne RN  Outcome: Progressing

## 2023-11-30 LAB
ANION GAP SERPL CALC-SCNC: 9 MMOL/L (ref 0–18)
BASOPHILS # BLD AUTO: 0.01 X10(3) UL (ref 0–0.2)
BASOPHILS NFR BLD AUTO: 0.1 %
BUN BLD-MCNC: 105 MG/DL (ref 9–23)
CALCIUM BLD-MCNC: 8.9 MG/DL (ref 8.5–10.1)
CHLORIDE SERPL-SCNC: 112 MMOL/L (ref 98–112)
CO2 SERPL-SCNC: 24 MMOL/L (ref 21–32)
CREAT BLD-MCNC: 2.67 MG/DL
EGFRCR SERPLBLD CKD-EPI 2021: 22 ML/MIN/1.73M2 (ref 60–?)
EOSINOPHIL # BLD AUTO: 0.09 X10(3) UL (ref 0–0.7)
EOSINOPHIL NFR BLD AUTO: 0.7 %
ERYTHROCYTE [DISTWIDTH] IN BLOOD BY AUTOMATED COUNT: 14.2 %
GLUCOSE BLD-MCNC: 145 MG/DL (ref 70–99)
GLUCOSE BLD-MCNC: 175 MG/DL (ref 70–99)
GLUCOSE BLD-MCNC: 283 MG/DL (ref 70–99)
GLUCOSE BLD-MCNC: 318 MG/DL (ref 70–99)
GLUCOSE BLD-MCNC: 73 MG/DL (ref 70–99)
GLUCOSE BLD-MCNC: 75 MG/DL (ref 70–99)
HCT VFR BLD AUTO: 27.9 %
HGB BLD-MCNC: 8.7 G/DL
IMM GRANULOCYTES # BLD AUTO: 0.1 X10(3) UL (ref 0–1)
IMM GRANULOCYTES NFR BLD: 0.7 %
LYMPHOCYTES # BLD AUTO: 1.05 X10(3) UL (ref 1–4)
LYMPHOCYTES NFR BLD AUTO: 7.8 %
MCH RBC QN AUTO: 31 PG (ref 26–34)
MCHC RBC AUTO-ENTMCNC: 31.2 G/DL (ref 31–37)
MCV RBC AUTO: 99.3 FL
MONOCYTES # BLD AUTO: 0.35 X10(3) UL (ref 0.1–1)
MONOCYTES NFR BLD AUTO: 2.6 %
NEUTROPHILS # BLD AUTO: 11.78 X10 (3) UL (ref 1.5–7.7)
NEUTROPHILS # BLD AUTO: 11.78 X10(3) UL (ref 1.5–7.7)
NEUTROPHILS NFR BLD AUTO: 88.1 %
OSMOLALITY SERPL CALC.SUM OF ELEC: 332 MOSM/KG (ref 275–295)
PLATELET # BLD AUTO: 129 10(3)UL (ref 150–450)
POTASSIUM SERPL-SCNC: 3.9 MMOL/L (ref 3.5–5.1)
RBC # BLD AUTO: 2.81 X10(6)UL
SODIUM SERPL-SCNC: 145 MMOL/L (ref 136–145)
WBC # BLD AUTO: 13.4 X10(3) UL (ref 4–11)

## 2023-11-30 PROCEDURE — 99233 SBSQ HOSP IP/OBS HIGH 50: CPT | Performed by: CLINICAL NURSE SPECIALIST

## 2023-11-30 NOTE — PLAN OF CARE
Assumed care of pt at 0730. Pt in bed eyes closed , non verbal but open eyes to pain stimuli. Appears comfortable no , s/s of discomfort noted. Cont on NG tube feeding at goal @45. Cont on IV antibiotics. Call light within reach . Md updated on blood sugars. Son updated on pt will be here tomorrow at 2pm to meet with speech .

## 2023-11-30 NOTE — PLAN OF CARE
Assumed care of pt around 1930. Pt lethargic and nonverbal (baseline). Pt opens eyes and withdraws to painful stimuli. Remains on room air. Rhonchi lung sounds. No indication of shortness of breath or respiratory distress. NSR on tele. Dobbhoff in place for feeding and medication. Glucerna @ goal rate of 45 ml, Q4 flushes of 300 ml. Incontinent, external catheter in place. Last BM 11/29, abdomen flat, nondistended. No indication of pain. Tolerating turning side to side with assist. Fall precautions in place. 100.4 F temp for evening vitals, PRN tylenol given. Pt updated on plan of care. Call light within reach.   Plan: IV abx, NPO- continuous tube feedings, DW        Problem: Diabetes/Glucose Control  Goal: Glucose maintained within prescribed range  Description: INTERVENTIONS:  - Monitor Blood Glucose as ordered  - Assess for signs and symptoms of hyperglycemia and hypoglycemia  - Administer ordered medications to maintain glucose within target range  - Assess barriers to adequate nutritional intake and initiate nutrition consult as needed  - Instruct patient on self management of diabetes  Outcome: Progressing     Problem: Patient/Family Goals  Goal: Patient/Family Long Term Goal  Description: Patient's Long Term Goal: stay out of hospital    Interventions:  - med compliance, follow ups  - trend labs/sugars/vitals    - See additional Care Plan goals for specific interventions  Outcome: Progressing  Goal: Patient/Family Short Term Goal  Description: Patient's Short Term Goal: sugar control    Interventions:   - trend sugars Q6, tube feedings, insulin protocol, fluids    - See additional Care Plan goals for specific interventions  Outcome: Progressing     Problem: Delirium  Goal: Minimize duration of delirium  Description: Interventions:  - Encourage use of hearing aids, eye glasses  - Promote highest level of mobility daily  - Provide frequent reorientation  - Promote wakefulness i.e. lights on, blinds open  - Promote sleep, encourage patient's normal rest cycle i.e. lights off, TV off, minimize noise and interruptions  - Encourage family to assist in orientation and promotion of home routines  Outcome: Progressing     Problem: CARDIOVASCULAR - ADULT  Goal: Maintains optimal cardiac output and hemodynamic stability  Description: INTERVENTIONS:  - Monitor vital signs, rhythm, and trends  - Monitor for bleeding, hypotension and signs of decreased cardiac output  - Evaluate effectiveness of vasoactive medications to optimize hemodynamic stability  - Monitor arterial and/or venous puncture sites for bleeding and/or hematoma  - Assess quality of pulses, skin color and temperature  - Assess for signs of decreased coronary artery perfusion - ex.  Angina  - Evaluate fluid balance, assess for edema, trend weights  Outcome: Progressing     Problem: METABOLIC/FLUID AND ELECTROLYTES - ADULT  Goal: Glucose maintained within prescribed range  Description: INTERVENTIONS:  - Monitor Blood Glucose as ordered  - Assess for signs and symptoms of hyperglycemia and hypoglycemia  - Administer ordered medications to maintain glucose within target range  - Assess barriers to adequate nutritional intake and initiate nutrition consult as needed  - Instruct patient on self management of diabetes  Outcome: Progressing  Goal: Electrolytes maintained within normal limits  Description: INTERVENTIONS:  - Monitor labs and rhythm and assess patient for signs and symptoms of electrolyte imbalances  - Administer electrolyte replacement as ordered  - Monitor response to electrolyte replacements, including rhythm and repeat lab results as appropriate  - Fluid restriction as ordered  - Instruct patient on fluid and nutrition restrictions as appropriate  Outcome: Progressing  Goal: Hemodynamic stability and optimal renal function maintained  Description: INTERVENTIONS:  - Monitor labs and assess for signs and symptoms of volume excess or deficit  - Monitor intake, output and patient weight  - Monitor urine specific gravity, serum osmolarity and serum sodium as indicated or ordered  - Monitor response to interventions for patient's volume status, including labs, urine output, blood pressure (other measures as available)  - Encourage oral intake as appropriate  - Instruct patient on fluid and nutrition restrictions as appropriate  Outcome: Progressing     Problem: SKIN/TISSUE INTEGRITY - ADULT  Goal: Skin integrity remains intact  Description: INTERVENTIONS  - Assess and document risk factors for pressure ulcer development  - Assess and document skin integrity  - Monitor for areas of redness and/or skin breakdown  - Initiate interventions, skin care algorithm/standards of care as needed  Outcome: Progressing     Problem: Safety Risk - Non-Violent Restraints  Goal: Patient will remain free from self-harm  Description: INTERVENTIONS:  - Apply the least restrictive restraint to prevent harm  - Notify patient and family of reasons restraints applied  - Assess for any contributing factors to confusion (electrolyte disturbances, delirium, medications)  - Discontinue any unnecessary medical devices as soon as possible  - Assess the patient's physical comfort, circulation, skin condition, hydration, nutrition and elimination needs   - Reorient and redirection as needed  - Assess for the need to continue restraints  Outcome: Progressing

## 2023-11-30 NOTE — CM/SW NOTE
Sent updates to [facility] in aidin. BLAKE/CM to remain available for support and/or discharge planning.     ISABELLE Booth  Discharge Planner  [phone]

## 2023-12-01 LAB
ANION GAP SERPL CALC-SCNC: 6 MMOL/L (ref 0–18)
BASOPHILS # BLD AUTO: 0.01 X10(3) UL (ref 0–0.2)
BASOPHILS NFR BLD AUTO: 0.1 %
BUN BLD-MCNC: 106 MG/DL (ref 9–23)
CALCIUM BLD-MCNC: 8.6 MG/DL (ref 8.5–10.1)
CHLORIDE SERPL-SCNC: 108 MMOL/L (ref 98–112)
CO2 SERPL-SCNC: 26 MMOL/L (ref 21–32)
CREAT BLD-MCNC: 2.96 MG/DL
EGFRCR SERPLBLD CKD-EPI 2021: 19 ML/MIN/1.73M2 (ref 60–?)
EOSINOPHIL # BLD AUTO: 0.07 X10(3) UL (ref 0–0.7)
EOSINOPHIL NFR BLD AUTO: 0.5 %
ERYTHROCYTE [DISTWIDTH] IN BLOOD BY AUTOMATED COUNT: 14.1 %
GLUCOSE BLD-MCNC: 105 MG/DL (ref 70–99)
GLUCOSE BLD-MCNC: 107 MG/DL (ref 70–99)
GLUCOSE BLD-MCNC: 107 MG/DL (ref 70–99)
GLUCOSE BLD-MCNC: 111 MG/DL (ref 70–99)
GLUCOSE BLD-MCNC: 116 MG/DL (ref 70–99)
GLUCOSE BLD-MCNC: 126 MG/DL (ref 70–99)
GLUCOSE BLD-MCNC: 134 MG/DL (ref 70–99)
GLUCOSE BLD-MCNC: 262 MG/DL (ref 70–99)
GLUCOSE BLD-MCNC: 44 MG/DL (ref 70–99)
GLUCOSE BLD-MCNC: 46 MG/DL (ref 70–99)
HCT VFR BLD AUTO: 26.1 %
HGB BLD-MCNC: 8.5 G/DL
IMM GRANULOCYTES # BLD AUTO: 0.1 X10(3) UL (ref 0–1)
IMM GRANULOCYTES NFR BLD: 0.7 %
LYMPHOCYTES # BLD AUTO: 0.91 X10(3) UL (ref 1–4)
LYMPHOCYTES NFR BLD AUTO: 6.6 %
MCH RBC QN AUTO: 31.4 PG (ref 26–34)
MCHC RBC AUTO-ENTMCNC: 32.6 G/DL (ref 31–37)
MCV RBC AUTO: 96.3 FL
MONOCYTES # BLD AUTO: 0.35 X10(3) UL (ref 0.1–1)
MONOCYTES NFR BLD AUTO: 2.5 %
NEUTROPHILS # BLD AUTO: 12.34 X10 (3) UL (ref 1.5–7.7)
NEUTROPHILS # BLD AUTO: 12.34 X10(3) UL (ref 1.5–7.7)
NEUTROPHILS NFR BLD AUTO: 89.6 %
OSMOLALITY SERPL CALC.SUM OF ELEC: 324 MOSM/KG (ref 275–295)
PLATELET # BLD AUTO: 199 10(3)UL (ref 150–450)
POTASSIUM SERPL-SCNC: 4 MMOL/L (ref 3.5–5.1)
RBC # BLD AUTO: 2.71 X10(6)UL
SODIUM SERPL-SCNC: 140 MMOL/L (ref 136–145)
WBC # BLD AUTO: 13.8 X10(3) UL (ref 4–11)

## 2023-12-01 PROCEDURE — 99232 SBSQ HOSP IP/OBS MODERATE 35: CPT | Performed by: NURSE PRACTITIONER

## 2023-12-01 RX ORDER — DEXTROSE MONOHYDRATE 50 MG/ML
INJECTION, SOLUTION INTRAVENOUS CONTINUOUS
Status: DISCONTINUED | OUTPATIENT
Start: 2023-12-01 | End: 2023-12-02

## 2023-12-01 NOTE — SLP NOTE
ADULT SWALLOWING EVALUATION    ASSESSMENT    ASSESSMENT/OVERALL IMPRESSION:  Pt presented to [facility] from memory care facility due to uncontrolled hyperglycemia. Pmhx includes severe dementia (pt non-verbal at baseline); DM, HTN. Repeated bedside swallow evaluations attempted however pt lethargic and unsafe for po trials. Son requested to be present for evaluation this afternoon. Son present and pt found lying down in bed upon SLP entering room. Pt sleeping and minimally responsive despite repeated auditory and tactile attempts by son and SLP to awaken. Oral care provided via moistened toothette with partial removal of dry crust from lips and tongue. Spoon coated with applesauce and inserted into oral cavity however pt made no attempt to close lips around spoon. No attempt to transfer bolus posteriorly when small amount of pureed placed on anterior tongue. Bolus was manually removed from oral cavity by clinician. Son attempted to feed patient sips of nectar thick liquids via cup however patient again made no attempt at labial closure around cup and entire bolus spilled from pt's oral cavity anteriorly. PO trials were terminated and long discussed with son ensued regarding risks of aspiration; rationale for NPO and hospice vs PEG tube plan. Son reported that his dad has expressed that he would not want PEG tube and will discuss hospice with his mother when able to contact. Answered remaining questions to his apparent satisfaction. . Informed RN and palliative care of results and recommendations. No further services warranted. Will d/c from services at this time.            RECOMMENDATIONS   Diet Recommendations - Solids: NPO  Diet Recommendations - Liquids: NPO                        Compensatory Strategies Recommended:  (n/a)  Aspiration Precautions:  (n/a)  Medication Administration Recommendations: Non-oral  Treatment Plan/Recommendations: No further inpatient SLP service warranted  Discharge Recommendations/Plan: Undetermined    HISTORY   MEDICAL HISTORY  Reason for Referral: R/O aspiration    Problem List  Principal Problem:    Acute myocardial infarction, unspecified MI type, unspecified artery ([facility]  Active Problems:    Leukocytosis    Acute kidney injury ([facility] [address] [facility]    Azotemia    Hyperglycemia    Hypokalemia    Hypernatremia    Palliative care encounter    Goals of care, counseling/discussion      Past Medical History  Past Medical History:   Diagnosis Date    Alzheimer disease ([facility] [address]     [facility]     declines cardiac calcium score    Aortic ectasia ([facility] 12/2015    noted on CXR    BPH (benign prostatic hyperplasia)     Chronic anemia     DDD (degenerative disc disease), lumbar 8/2012 MRI    History of cardiovascular stress test 2013    normal    Impotence of organic origin     Memory loss     Other and unspecified hyperlipidemia     PAC (premature atrial contraction)     PSVT (paroxysmal supraventricular tachycardia)     PVC's (premature ventricular contractions)     RBBB     Type II or unspecified type diabetes mellitus without mention of complication, not stated as uncontrolled     Unspecified essential hypertension        Prior Living Situation: Skilled nursing facility  Diet Prior to Admission: Puree; Thin liquids       Patient/Family Goals: no PEG    SWALLOWING HISTORY  Current Diet Consistency: NPO  Dysphagia History: see above  Imaging Results: cxr 11/23/23  CONCLUSION:    1. Left lower lobe retrocardiac increased opacity as detailed above, lateral projection would be helpful for further evaluation as clinically indicated.    SUBJECTIVE       OBJECTIVE   ORAL MOTOR EXAMINATION  Dentition:  (unable to assess)  Symmetry: Unable to assess (pt unable to follow directions for task)  Strength: Unable to assess  Tone: Unable to assess  Range of Motion: Unable to assess  Rate of Motion: Unable to assess    Voice Quality:  (non-verbal)  Respiratory Status: Unlabored  Consistencies Trialed: Puree;Nectar thick liquids  Method of Presentation: Spoon;Cup  Patient Positioning: Upright;Midline (pt lying down in bed HOB elevated to 90 degrees)    Oral Phase of Swallow: Impaired  Bolus Retrieval: Impaired  Bilabial Seal: Impaired  Bolus Formation: Impaired  Bolus Propulsion: Impaired     Retention: Impaired    Pharyngeal Phase of Swallow: Impaired  Laryngeal Elevation Timing: Appears impaired  Laryngeal Elevation Strength: Appears impaired  Laryngeal Elevation Coordination: Appears impaired  (Please note: Silent aspiration cannot be evaluated clinically.  Videofluoroscopic Swallow Study is required to rule-out silent aspiration.)    Esophageal Phase of Swallow:  (unable to assess)  Comments:         FOLLOW UP  Treatment Plan/Recommendations: No further inpatient SLP service warranted     Follow Up Needed (Documentation Required): No  SLP Follow-up Date: 11/30/23    Thank you for your referral.   If you have any questions, please contact SHAMA [place], [phone] [facility]  Pager

## 2023-12-01 NOTE — PLAN OF CARE
Assumed care of pt around 1930. Pt lethargic, nonverbal, briefly opens eye to painful stimuli. Remains on room air, diminished lung sounds. No indication of sob or distress. NSR on tele. Dobbhoff in place for feeding and medication. Feeding at goal rate of 45 ml. Incontinent, external catheter in place. Tolerates turning side to side with assist. Fall precautions in place. Plan:   IV abx, NPO - continuous tube feedings, DW    Hypoglycemic in AM, MD notified, see orders.       Problem: Diabetes/Glucose Control  Goal: Glucose maintained within prescribed range  Description: INTERVENTIONS:  - Monitor Blood Glucose as ordered  - Assess for signs and symptoms of hyperglycemia and hypoglycemia  - Administer ordered medications to maintain glucose within target range  - Assess barriers to adequate nutritional intake and initiate nutrition consult as needed  - Instruct patient on self management of diabetes  Outcome: Progressing     Problem: Patient/Family Goals  Goal: Patient/Family Long Term Goal  Description: Patient's Long Term Goal: stay out of hospital     Interventions:  - med compliance, follow ups  - trend labs/sugars/vitals    - See additional Care Plan goals for specific interventions  Outcome: Progressing  Goal: Patient/Family Short Term Goal  Description: Patient's Short Term Goal: sugar control     Interventions:   - trend sugars Q6, tube feedings, insulin protocol, fluids    - See additional Care Plan goals for specific interventions  Outcome: Progressing     Problem: Delirium  Goal: Minimize duration of delirium  Description: Interventions:  - Encourage use of hearing aids, eye glasses  - Promote highest level of mobility daily  - Provide frequent reorientation  - Promote wakefulness i.e. lights on, blinds open  - Promote sleep, encourage patient's normal rest cycle i.e. lights off, TV off, minimize noise and interruptions  - Encourage family to assist in orientation and promotion of home routines  Outcome: Progressing     Problem: CARDIOVASCULAR - ADULT  Goal: Maintains optimal cardiac output and hemodynamic stability  Description: INTERVENTIONS:  - Monitor vital signs, rhythm, and trends  - Monitor for bleeding, hypotension and signs of decreased cardiac output  - Evaluate effectiveness of vasoactive medications to optimize hemodynamic stability  - Monitor arterial and/or venous puncture sites for bleeding and/or hematoma  - Assess quality of pulses, skin color and temperature  - Assess for signs of decreased coronary artery perfusion - ex.  Angina  - Evaluate fluid balance, assess for edema, trend weights  Outcome: Progressing     Problem: METABOLIC/FLUID AND ELECTROLYTES - ADULT  Goal: Glucose maintained within prescribed range  Description: INTERVENTIONS:  - Monitor Blood Glucose as ordered  - Assess for signs and symptoms of hyperglycemia and hypoglycemia  - Administer ordered medications to maintain glucose within target range  - Assess barriers to adequate nutritional intake and initiate nutrition consult as needed  - Instruct patient on self management of diabetes  Outcome: Progressing  Goal: Electrolytes maintained within normal limits  Description: INTERVENTIONS:  - Monitor labs and rhythm and assess patient for signs and symptoms of electrolyte imbalances  - Administer electrolyte replacement as ordered  - Monitor response to electrolyte replacements, including rhythm and repeat lab results as appropriate  - Fluid restriction as ordered  - Instruct patient on fluid and nutrition restrictions as appropriate  Outcome: Progressing  Goal: Hemodynamic stability and optimal renal function maintained  Description: INTERVENTIONS:  - Monitor labs and assess for signs and symptoms of volume excess or deficit  - Monitor intake, output and patient weight  - Monitor urine specific gravity, serum osmolarity and serum sodium as indicated or ordered  - Monitor response to interventions for patient's volume status, including labs, urine output, blood pressure (other measures as available)  - Encourage oral intake as appropriate  - Instruct patient on fluid and nutrition restrictions as appropriate  Outcome: Progressing     Problem: SKIN/TISSUE INTEGRITY - ADULT  Goal: Skin integrity remains intact  Description: INTERVENTIONS  - Assess and document risk factors for pressure ulcer development  - Assess and document skin integrity  - Monitor for areas of redness and/or skin breakdown  - Initiate interventions, skin care algorithm/standards of care as needed  Outcome: Progressing     Problem: Safety Risk - Non-Violent Restraints  Goal: Patient will remain free from self-harm  Description: INTERVENTIONS:  - Apply the least restrictive restraint to prevent harm  - Notify patient and family of reasons restraints applied  - Assess for any contributing factors to confusion (electrolyte disturbances, delirium, medications)  - Discontinue any unnecessary medical devices as soon as possible  - Assess the patient's physical comfort, circulation, skin condition, hydration, nutrition and elimination needs   - Reorient and redirection as needed  - Assess for the need to continue restraints  Outcome: Progressing

## 2023-12-01 NOTE — SLP NOTE
Pt remains poorly responsive and unsafe for po trials. Will d/c from services at this time. Please re-refer if/when pt more alert & participatory.     Yanira Keyes MA, [id number] [facility]  Pager

## 2023-12-01 NOTE — PLAN OF CARE
Rec'd pt at 0730. KYLAH alertness, nonverbal at baseline, opens eyes to painful stimuli. Tele shows NSR. O2 sats adequate on RA. Pt incontinent, briefed, male purewick in place. New mepilex placed on sacrum d/t redness. Q2 turns and bunny boots on. Bed locked and in low position, call light and personal items within reach. Will continue to monitor. POC - Cont tube feeds, IV abx, D5 gtt & blood sugar checks q6, poss SLP eval today if pt alert enough.     Problem: Diabetes/Glucose Control  Goal: Glucose maintained within prescribed range  Description: INTERVENTIONS:  - Monitor Blood Glucose as ordered  - Assess for signs and symptoms of hyperglycemia and hypoglycemia  - Administer ordered medications to maintain glucose within target range  - Assess barriers to adequate nutritional intake and initiate nutrition consult as needed  - Instruct patient on self management of diabetes  Outcome: Progressing     Problem: Patient/Family Goals  Goal: Patient/Family Long Term Goal  Description: Patient's Long Term Goal: stay out of hospital 11-25    Interventions:  - med compliance, follow ups  - trend labs/sugars/vitals    - See additional Care Plan goals for specific interventions  Outcome: Progressing  Goal: Patient/Family Short Term Goal  Description: Patient's Short Term Goal: sugar control 11-25    Interventions:   - trend sugars Q6, tube feedings, insulin protocol, fluids    - See additional Care Plan goals for specific interventions  Outcome: Progressing     Problem: Delirium  Goal: Minimize duration of delirium  Description: Interventions:  - Encourage use of hearing aids, eye glasses  - Promote highest level of mobility daily  - Provide frequent reorientation  - Promote wakefulness i.e. lights on, blinds open  - Promote sleep, encourage patient's normal rest cycle i.e. lights off, TV off, minimize noise and interruptions  - Encourage family to assist in orientation and promotion of home routines  Outcome: Progressing Problem: CARDIOVASCULAR - ADULT  Goal: Maintains optimal cardiac output and hemodynamic stability  Description: INTERVENTIONS:  - Monitor vital signs, rhythm, and trends  - Monitor for bleeding, hypotension and signs of decreased cardiac output  - Evaluate effectiveness of vasoactive medications to optimize hemodynamic stability  - Monitor arterial and/or venous puncture sites for bleeding and/or hematoma  - Assess quality of pulses, skin color and temperature  - Assess for signs of decreased coronary artery perfusion - ex.  Angina  - Evaluate fluid balance, assess for edema, trend weights  Outcome: Progressing     Problem: METABOLIC/FLUID AND ELECTROLYTES - ADULT  Goal: Glucose maintained within prescribed range  Description: INTERVENTIONS:  - Monitor Blood Glucose as ordered  - Assess for signs and symptoms of hyperglycemia and hypoglycemia  - Administer ordered medications to maintain glucose within target range  - Assess barriers to adequate nutritional intake and initiate nutrition consult as needed  - Instruct patient on self management of diabetes  Outcome: Progressing  Goal: Electrolytes maintained within normal limits  Description: INTERVENTIONS:  - Monitor labs and rhythm and assess patient for signs and symptoms of electrolyte imbalances  - Administer electrolyte replacement as ordered  - Monitor response to electrolyte replacements, including rhythm and repeat lab results as appropriate  - Fluid restriction as ordered  - Instruct patient on fluid and nutrition restrictions as appropriate  Outcome: Progressing  Goal: Hemodynamic stability and optimal renal function maintained  Description: INTERVENTIONS:  - Monitor labs and assess for signs and symptoms of volume excess or deficit  - Monitor intake, output and patient weight  - Monitor urine specific gravity, serum osmolarity and serum sodium as indicated or ordered  - Monitor response to interventions for patient's volume status, including labs, urine output, blood pressure (other measures as available)  - Encourage oral intake as appropriate  - Instruct patient on fluid and nutrition restrictions as appropriate  Outcome: Progressing     Problem: SKIN/TISSUE INTEGRITY - ADULT  Goal: Skin integrity remains intact  Description: INTERVENTIONS  - Assess and document risk factors for pressure ulcer development  - Assess and document skin integrity  - Monitor for areas of redness and/or skin breakdown  - Initiate interventions, skin care algorithm/standards of care as needed  Outcome: Progressing     Problem: Safety Risk - Non-Violent Restraints  Goal: Patient will remain free from self-harm  Description: INTERVENTIONS:  - Apply the least restrictive restraint to prevent harm  - Notify patient and family of reasons restraints applied  - Assess for any contributing factors to confusion (electrolyte disturbances, delirium, medications)  - Discontinue any unnecessary medical devices as soon as possible  - Assess the patient's physical comfort, circulation, skin condition, hydration, nutrition and elimination needs   - Reorient and redirection as needed  - Assess for the need to continue restraints  Outcome: Progressing

## 2023-12-02 ENCOUNTER — APPOINTMENT (OUTPATIENT)
Dept: GENERAL RADIOLOGY | Facility: HOSPITAL | Age: 88
End: 2023-12-02
Attending: HOSPITALIST
Payer: MEDICARE

## 2023-12-02 LAB
ANION GAP SERPL CALC-SCNC: 10 MMOL/L (ref 0–18)
BASOPHILS # BLD AUTO: 0.01 X10(3) UL (ref 0–0.2)
BASOPHILS NFR BLD AUTO: 0.1 %
BUN BLD-MCNC: 102 MG/DL (ref 9–23)
CALCIUM BLD-MCNC: 8.6 MG/DL (ref 8.5–10.1)
CHLORIDE SERPL-SCNC: 102 MMOL/L (ref 98–112)
CO2 SERPL-SCNC: 27 MMOL/L (ref 21–32)
CREAT BLD-MCNC: 2.71 MG/DL
EGFRCR SERPLBLD CKD-EPI 2021: 22 ML/MIN/1.73M2 (ref 60–?)
EOSINOPHIL # BLD AUTO: 0.09 X10(3) UL (ref 0–0.7)
EOSINOPHIL NFR BLD AUTO: 0.7 %
ERYTHROCYTE [DISTWIDTH] IN BLOOD BY AUTOMATED COUNT: 14.1 %
GLUCOSE BLD-MCNC: 131 MG/DL (ref 70–99)
GLUCOSE BLD-MCNC: 143 MG/DL (ref 70–99)
GLUCOSE BLD-MCNC: 152 MG/DL (ref 70–99)
GLUCOSE BLD-MCNC: 155 MG/DL (ref 70–99)
GLUCOSE BLD-MCNC: 159 MG/DL (ref 70–99)
GLUCOSE BLD-MCNC: 166 MG/DL (ref 70–99)
GLUCOSE BLD-MCNC: 289 MG/DL (ref 70–99)
HCT VFR BLD AUTO: 26.2 %
HGB BLD-MCNC: 8.8 G/DL
IMM GRANULOCYTES # BLD AUTO: 0.08 X10(3) UL (ref 0–1)
IMM GRANULOCYTES NFR BLD: 0.6 %
LYMPHOCYTES # BLD AUTO: 0.89 X10(3) UL (ref 1–4)
LYMPHOCYTES NFR BLD AUTO: 7 %
MCH RBC QN AUTO: 31.5 PG (ref 26–34)
MCHC RBC AUTO-ENTMCNC: 33.6 G/DL (ref 31–37)
MCV RBC AUTO: 93.9 FL
MONOCYTES # BLD AUTO: 0.36 X10(3) UL (ref 0.1–1)
MONOCYTES NFR BLD AUTO: 2.8 %
NEUTROPHILS # BLD AUTO: 11.3 X10 (3) UL (ref 1.5–7.7)
NEUTROPHILS # BLD AUTO: 11.3 X10(3) UL (ref 1.5–7.7)
NEUTROPHILS NFR BLD AUTO: 88.8 %
OSMOLALITY SERPL CALC.SUM OF ELEC: 323 MOSM/KG (ref 275–295)
PLATELET # BLD AUTO: 236 10(3)UL (ref 150–450)
POTASSIUM SERPL-SCNC: 4 MMOL/L (ref 3.5–5.1)
RBC # BLD AUTO: 2.79 X10(6)UL
SODIUM SERPL-SCNC: 139 MMOL/L (ref 136–145)
WBC # BLD AUTO: 12.7 X10(3) UL (ref 4–11)

## 2023-12-02 PROCEDURE — 74018 RADEX ABDOMEN 1 VIEW: CPT | Performed by: HOSPITALIST

## 2023-12-02 RX ORDER — SODIUM CHLORIDE 450 MG/100ML
INJECTION, SOLUTION INTRAVENOUS CONTINUOUS
Status: DISCONTINUED | OUTPATIENT
Start: 2023-12-02 | End: 2023-12-03

## 2023-12-02 NOTE — PLAN OF CARE
Rec'd pt at 0730. KYLAH alertness, nonverbal at baseline, opens eyes to painful stimuli. Tele shows NSR. O2 sats adequate on RA. Pt incontinent, briefed, male purewick in place. Redness noted on sacrum, new mepilex placed. Q2 turns and bunny boots on. Bed locked and in low position, call light and personal items within reach. Will continue to monitor. POC - Cont tube feeds via NG tube, IV abx, GI to see Monday to eval for possible PEG placement.     Problem: Diabetes/Glucose Control  Goal: Glucose maintained within prescribed range  Description: INTERVENTIONS:  - Monitor Blood Glucose as ordered  - Assess for signs and symptoms of hyperglycemia and hypoglycemia  - Administer ordered medications to maintain glucose within target range  - Assess barriers to adequate nutritional intake and initiate nutrition consult as needed  - Instruct patient on self management of diabetes  Outcome: Progressing     Problem: Patient/Family Goals  Goal: Patient/Family Long Term Goal  Description: Patient's Long Term Goal: stay out of hospital 11-25    Interventions:  - med compliance, follow ups  - trend labs/sugars/vitals    - See additional Care Plan goals for specific interventions  Outcome: Progressing  Goal: Patient/Family Short Term Goal  Description: Patient's Short Term Goal: sugar control 11-25    Interventions:   - trend sugars Q6, tube feedings, insulin protocol, fluids    - See additional Care Plan goals for specific interventions  Outcome: Progressing     Problem: Delirium  Goal: Minimize duration of delirium  Description: Interventions:  - Encourage use of hearing aids, eye glasses  - Promote highest level of mobility daily  - Provide frequent reorientation  - Promote wakefulness i.e. lights on, blinds open  - Promote sleep, encourage patient's normal rest cycle i.e. lights off, TV off, minimize noise and interruptions  - Encourage family to assist in orientation and promotion of home routines  Outcome: Progressing Problem: CARDIOVASCULAR - ADULT  Goal: Maintains optimal cardiac output and hemodynamic stability  Description: INTERVENTIONS:  - Monitor vital signs, rhythm, and trends  - Monitor for bleeding, hypotension and signs of decreased cardiac output  - Evaluate effectiveness of vasoactive medications to optimize hemodynamic stability  - Monitor arterial and/or venous puncture sites for bleeding and/or hematoma  - Assess quality of pulses, skin color and temperature  - Assess for signs of decreased coronary artery perfusion - ex.  Angina  - Evaluate fluid balance, assess for edema, trend weights  Outcome: Progressing     Problem: METABOLIC/FLUID AND ELECTROLYTES - ADULT  Goal: Glucose maintained within prescribed range  Description: INTERVENTIONS:  - Monitor Blood Glucose as ordered  - Assess for signs and symptoms of hyperglycemia and hypoglycemia  - Administer ordered medications to maintain glucose within target range  - Assess barriers to adequate nutritional intake and initiate nutrition consult as needed  - Instruct patient on self management of diabetes  Outcome: Progressing  Goal: Electrolytes maintained within normal limits  Description: INTERVENTIONS:  - Monitor labs and rhythm and assess patient for signs and symptoms of electrolyte imbalances  - Administer electrolyte replacement as ordered  - Monitor response to electrolyte replacements, including rhythm and repeat lab results as appropriate  - Fluid restriction as ordered  - Instruct patient on fluid and nutrition restrictions as appropriate  Outcome: Progressing  Goal: Hemodynamic stability and optimal renal function maintained  Description: INTERVENTIONS:  - Monitor labs and assess for signs and symptoms of volume excess or deficit  - Monitor intake, output and patient weight  - Monitor urine specific gravity, serum osmolarity and serum sodium as indicated or ordered  - Monitor response to interventions for patient's volume status, including labs, urine output, blood pressure (other measures as available)  - Encourage oral intake as appropriate  - Instruct patient on fluid and nutrition restrictions as appropriate  Outcome: Progressing     Problem: SKIN/TISSUE INTEGRITY - ADULT  Goal: Skin integrity remains intact  Description: INTERVENTIONS  - Assess and document risk factors for pressure ulcer development  - Assess and document skin integrity  - Monitor for areas of redness and/or skin breakdown  - Initiate interventions, skin care algorithm/standards of care as needed  Outcome: Progressing     Problem: Safety Risk - Non-Violent Restraints  Goal: Patient will remain free from self-harm  Description: INTERVENTIONS:  - Apply the least restrictive restraint to prevent harm  - Notify patient and family of reasons restraints applied  - Assess for any contributing factors to confusion (electrolyte disturbances, delirium, medications)  - Discontinue any unnecessary medical devices as soon as possible  - Assess the patient's physical comfort, circulation, skin condition, hydration, nutrition and elimination needs   - Reorient and redirection as needed  - Assess for the need to continue restraints  Outcome: Progressing

## 2023-12-02 NOTE — CM/SW NOTE
Noted ongoing discussions regarding [address]. If family unable to agree to hospice, should proceed with GI consult to determine eligibility for PEG placement. Pt can return to SNF ([facility]) where he is a resident once nutrition plan secured and family can continue to discuss GOC/hospice once [place]. Shared with care team and hospitalist will consult GI. Ongoing GOC to continue with family in the mean time.      GENNY CamaraN, RN-BC    [id number]

## 2023-12-02 NOTE — PLAN OF CARE
Unable to assess patient's orientation, somnolent. Nonverbal at baseline. Bedrest with pillow support, turned and repositioned overnight. NSR on tele. Maintaining o2 sats on RA. NGT in place, tube feedings infusing at goal. No residuals. Briefed/incontinent, male [address] in place. Reinforced sacral mepilex, dressing c/d/I. No signs of pain or discomfort. IVF. Safety precautions put in place, bed alarm on.         Problem: Diabetes/Glucose Control  Goal: Glucose maintained within prescribed range  Description: INTERVENTIONS:  - Monitor Blood Glucose as ordered  - Assess for signs and symptoms of hyperglycemia and hypoglycemia  - Administer ordered medications to maintain glucose within target range  - Assess barriers to adequate nutritional intake and initiate nutrition consult as needed  - Instruct patient on self management of diabetes  Outcome: Progressing     Problem: Patient/Family Goals  Goal: Patient/Family Long Term Goal  Description: Patient's Long Term Goal: stay out of hospital 11-25    Interventions:  - med compliance, follow ups  - trend labs/sugars/vitals    - See additional Care Plan goals for specific interventions  Outcome: Progressing  Goal: Patient/Family Short Term Goal  Description: Patient's Short Term Goal: sugar control 11-25    Interventions:   - trend sugars Q6, tube feedings, insulin protocol, fluids    - See additional Care Plan goals for specific interventions  Outcome: Progressing     Problem: Delirium  Goal: Minimize duration of delirium  Description: Interventions:  - Encourage use of hearing aids, eye glasses  - Promote highest level of mobility daily  - Provide frequent reorientation  - Promote wakefulness i.e. lights on, blinds open  - Promote sleep, encourage patient's normal rest cycle i.e. lights off, TV off, minimize noise and interruptions  - Encourage family to assist in orientation and promotion of home routines  Outcome: Progressing     Problem: CARDIOVASCULAR - ADULT  Goal: Maintains optimal cardiac output and hemodynamic stability  Description: INTERVENTIONS:  - Monitor vital signs, rhythm, and trends  - Monitor for bleeding, hypotension and signs of decreased cardiac output  - Evaluate effectiveness of vasoactive medications to optimize hemodynamic stability  - Monitor arterial and/or venous puncture sites for bleeding and/or hematoma  - Assess quality of pulses, skin color and temperature  - Assess for signs of decreased coronary artery perfusion - ex.  Angina  - Evaluate fluid balance, assess for edema, trend weights  Outcome: Progressing     Problem: METABOLIC/FLUID AND ELECTROLYTES - ADULT  Goal: Glucose maintained within prescribed range  Description: INTERVENTIONS:  - Monitor Blood Glucose as ordered  - Assess for signs and symptoms of hyperglycemia and hypoglycemia  - Administer ordered medications to maintain glucose within target range  - Assess barriers to adequate nutritional intake and initiate nutrition consult as needed  - Instruct patient on self management of diabetes  Outcome: Progressing  Goal: Electrolytes maintained within normal limits  Description: INTERVENTIONS:  - Monitor labs and rhythm and assess patient for signs and symptoms of electrolyte imbalances  - Administer electrolyte replacement as ordered  - Monitor response to electrolyte replacements, including rhythm and repeat lab results as appropriate  - Fluid restriction as ordered  - Instruct patient on fluid and nutrition restrictions as appropriate  Outcome: Progressing  Goal: Hemodynamic stability and optimal renal function maintained  Description: INTERVENTIONS:  - Monitor labs and assess for signs and symptoms of volume excess or deficit  - Monitor intake, output and patient weight  - Monitor urine specific gravity, serum osmolarity and serum sodium as indicated or ordered  - Monitor response to interventions for patient's volume status, including labs, urine output, blood pressure (other measures as available)  - Encourage oral intake as appropriate  - Instruct patient on fluid and nutrition restrictions as appropriate  Outcome: Progressing     Problem: SKIN/TISSUE INTEGRITY - ADULT  Goal: Skin integrity remains intact  Description: INTERVENTIONS  - Assess and document risk factors for pressure ulcer development  - Assess and document skin integrity  - Monitor for areas of redness and/or skin breakdown  - Initiate interventions, skin care algorithm/standards of care as needed  Outcome: Progressing     Problem: Safety Risk - Non-Violent Restraints  Goal: Patient will remain free from self-harm  Description: INTERVENTIONS:  - Apply the least restrictive restraint to prevent harm  - Notify patient and family of reasons restraints applied  - Assess for any contributing factors to confusion (electrolyte disturbances, delirium, medications)  - Discontinue any unnecessary medical devices as soon as possible  - Assess the patient's physical comfort, circulation, skin condition, hydration, nutrition and elimination needs   - Reorient and redirection as needed  - Assess for the need to continue restraints  Outcome: Progressing

## 2023-12-03 LAB
ANION GAP SERPL CALC-SCNC: 10 MMOL/L (ref 0–18)
ANION GAP SERPL CALC-SCNC: 6 MMOL/L (ref 0–18)
BASOPHILS # BLD AUTO: 0.01 X10(3) UL (ref 0–0.2)
BASOPHILS NFR BLD AUTO: 0.1 %
BUN BLD-MCNC: 91 MG/DL (ref 9–23)
BUN BLD-MCNC: 92 MG/DL (ref 9–23)
CALCIUM BLD-MCNC: 7 MG/DL (ref 8.5–10.1)
CALCIUM BLD-MCNC: 8 MG/DL (ref 8.5–10.1)
CHLORIDE SERPL-SCNC: 100 MMOL/L (ref 98–112)
CHLORIDE SERPL-SCNC: 100 MMOL/L (ref 98–112)
CO2 SERPL-SCNC: 20 MMOL/L (ref 21–32)
CO2 SERPL-SCNC: 26 MMOL/L (ref 21–32)
CREAT BLD-MCNC: 2.46 MG/DL
CREAT BLD-MCNC: 2.53 MG/DL
EGFRCR SERPLBLD CKD-EPI 2021: 23 ML/MIN/1.73M2 (ref 60–?)
EGFRCR SERPLBLD CKD-EPI 2021: 24 ML/MIN/1.73M2 (ref 60–?)
EOSINOPHIL # BLD AUTO: 0.06 X10(3) UL (ref 0–0.7)
EOSINOPHIL NFR BLD AUTO: 0.4 %
ERYTHROCYTE [DISTWIDTH] IN BLOOD BY AUTOMATED COUNT: 13.8 %
GLUCOSE BLD-MCNC: 108 MG/DL (ref 70–99)
GLUCOSE BLD-MCNC: 75 MG/DL (ref 70–99)
GLUCOSE BLD-MCNC: 85 MG/DL (ref 70–99)
GLUCOSE BLD-MCNC: 93 MG/DL (ref 70–99)
HCT VFR BLD AUTO: 25.7 %
HGB BLD-MCNC: 8.3 G/DL
IMM GRANULOCYTES # BLD AUTO: 0.1 X10(3) UL (ref 0–1)
IMM GRANULOCYTES NFR BLD: 0.7 %
LYMPHOCYTES # BLD AUTO: 1.07 X10(3) UL (ref 1–4)
LYMPHOCYTES NFR BLD AUTO: 7 %
MCH RBC QN AUTO: 30.6 PG (ref 26–34)
MCHC RBC AUTO-ENTMCNC: 32.3 G/DL (ref 31–37)
MCV RBC AUTO: 94.8 FL
MONOCYTES # BLD AUTO: 0.5 X10(3) UL (ref 0.1–1)
MONOCYTES NFR BLD AUTO: 3.3 %
NEUTROPHILS # BLD AUTO: 13.49 X10 (3) UL (ref 1.5–7.7)
NEUTROPHILS # BLD AUTO: 13.49 X10(3) UL (ref 1.5–7.7)
NEUTROPHILS NFR BLD AUTO: 88.5 %
OSMOLALITY SERPL CALC.SUM OF ELEC: 297 MOSM/KG (ref 275–295)
OSMOLALITY SERPL CALC.SUM OF ELEC: 303 MOSM/KG (ref 275–295)
PLATELET # BLD AUTO: 241 10(3)UL (ref 150–450)
POTASSIUM SERPL-SCNC: 4.5 MMOL/L (ref 3.5–5.1)
POTASSIUM SERPL-SCNC: 4.7 MMOL/L (ref 3.5–5.1)
RBC # BLD AUTO: 2.71 X10(6)UL
SODIUM SERPL-SCNC: 130 MMOL/L (ref 136–145)
SODIUM SERPL-SCNC: 132 MMOL/L (ref 136–145)
WBC # BLD AUTO: 15.2 X10(3) UL (ref 4–11)

## 2023-12-03 RX ORDER — SODIUM CHLORIDE, SODIUM LACTATE, POTASSIUM CHLORIDE, CALCIUM CHLORIDE 600; 310; 30; 20 MG/100ML; MG/100ML; MG/100ML; MG/100ML
INJECTION, SOLUTION INTRAVENOUS CONTINUOUS
Status: DISCONTINUED | OUTPATIENT
Start: 2023-12-03 | End: 2023-12-08

## 2023-12-03 NOTE — PLAN OF CARE
Lethargic and oriented x0. Minimally responsive to pain. VSS. 1-2L O2. Non productive cough - suction at bedside for aspiration precautions. Repositioned q2. Tube feedings running - appears to be tolerating well. Family updated on pt status. All questions answered.    Problem: Diabetes/Glucose Control  Goal: Glucose maintained within prescribed range  Description: INTERVENTIONS:  - Monitor Blood Glucose as ordered  - Assess for signs and symptoms of hyperglycemia and hypoglycemia  - Administer ordered medications to maintain glucose within target range  - Assess barriers to adequate nutritional intake and initiate nutrition consult as needed  - Instruct patient on self management of diabetes  Outcome: Not Progressing     Problem: Patient/Family Goals  Goal: Patient/Family Long Term Goal  Description: Patient's Long Term Goal: stay out of hospital 11-25    Interventions:  - med compliance, follow ups  - trend labs/sugars/vitals    - See additional Care Plan goals for specific interventions  Outcome: Not Progressing  Goal: Patient/Family Short Term Goal  Description: Patient's Short Term Goal: sugar control 11-25    Interventions:   - trend sugars Q6, tube feedings, insulin protocol, fluids    - See additional Care Plan goals for specific interventions  Outcome: Not Progressing     Problem: Delirium  Goal: Minimize duration of delirium  Description: Interventions:  - Encourage use of hearing aids, eye glasses  - Promote highest level of mobility daily  - Provide frequent reorientation  - Promote wakefulness i.e. lights on, blinds open  - Promote sleep, encourage patient's normal rest cycle i.e. lights off, TV off, minimize noise and interruptions  - Encourage family to assist in orientation and promotion of home routines  Outcome: Not Progressing     Problem: CARDIOVASCULAR - ADULT  Goal: Maintains optimal cardiac output and hemodynamic stability  Description: INTERVENTIONS:  - Monitor vital signs, rhythm, and trends  - Monitor for bleeding, hypotension and signs of decreased cardiac output  - Evaluate effectiveness of vasoactive medications to optimize hemodynamic stability  - Monitor arterial and/or venous puncture sites for bleeding and/or hematoma  - Assess quality of pulses, skin color and temperature  - Assess for signs of decreased coronary artery perfusion - ex.  Angina  - Evaluate fluid balance, assess for edema, trend weights  Outcome: Not Progressing     Problem: METABOLIC/FLUID AND ELECTROLYTES - ADULT  Goal: Glucose maintained within prescribed range  Description: INTERVENTIONS:  - Monitor Blood Glucose as ordered  - Assess for signs and symptoms of hyperglycemia and hypoglycemia  - Administer ordered medications to maintain glucose within target range  - Assess barriers to adequate nutritional intake and initiate nutrition consult as needed  - Instruct patient on self management of diabetes  Outcome: Not Progressing  Goal: Electrolytes maintained within normal limits  Description: INTERVENTIONS:  - Monitor labs and rhythm and assess patient for signs and symptoms of electrolyte imbalances  - Administer electrolyte replacement as ordered  - Monitor response to electrolyte replacements, including rhythm and repeat lab results as appropriate  - Fluid restriction as ordered  - Instruct patient on fluid and nutrition restrictions as appropriate  Outcome: Not Progressing  Goal: Hemodynamic stability and optimal renal function maintained  Description: INTERVENTIONS:  - Monitor labs and assess for signs and symptoms of volume excess or deficit  - Monitor intake, output and patient weight  - Monitor urine specific gravity, serum osmolarity and serum sodium as indicated or ordered  - Monitor response to interventions for patient's volume status, including labs, urine output, blood pressure (other measures as available)  - Encourage oral intake as appropriate  - Instruct patient on fluid and nutrition restrictions as appropriate  Outcome: Not Progressing     Problem: SKIN/TISSUE INTEGRITY - ADULT  Goal: Skin integrity remains intact  Description: INTERVENTIONS  - Assess and document risk factors for pressure ulcer development  - Assess and document skin integrity  - Monitor for areas of redness and/or skin breakdown  - Initiate interventions, skin care algorithm/standards of care as needed  Outcome: Not Progressing     Problem: Safety Risk - Non-Violent Restraints  Goal: Patient will remain free from self-harm  Description: INTERVENTIONS:  - Apply the least restrictive restraint to prevent harm  - Notify patient and family of reasons restraints applied  - Assess for any contributing factors to confusion (electrolyte disturbances, delirium, medications)  - Discontinue any unnecessary medical devices as soon as possible  - Assess the patient's physical comfort, circulation, skin condition, hydration, nutrition and elimination needs   - Reorient and redirection as needed  - Assess for the need to continue restraints  Outcome: Not Progressing

## 2023-12-03 NOTE — PLAN OF CARE
Problem: Diabetes/Glucose Control  Tube feeding in progress, Type 2 Diabetes,  at goal 45 ml per hour with q 4 hour water flushes  Appears to tolerate feeding and goal  Daily wt stable last few days  Goal: Glucose maintained within prescribed range  Q6 hour POC glucose monitoring in progress  2100 POC for Levemir dose  Description: INTERVENTIONS:  - Monitor Blood Glucose as ordered  - Assess for signs and symptoms of hyperglycemia and hypoglycemia  - Administer ordered medications to maintain glucose within target range  - Assess barriers to adequate nutritional intake and initiate nutrition consult as needed  - Instruct patient on self management of diabetes  Outcome: Progressing     Problem: Patient/Family Goals  Discharge planning in progress  Goal: Patient/Family Long Term Goal  Description: Patient's Long Term Goal: stay out of hospital 11-25    Interventions:  - med compliance, follow ups  - trend labs/sugars/vitals    - See additional Care Plan goals for specific interventions  Outcome: Progressing  Goal: Patient/Family Short Term Goal  Description: Patient's Short Term Goal: sugar control 11-25    Interventions:   - trend sugars Q6, tube feedings, insulin protocol, fluids    - See additional Care Plan goals for specific interventions  Outcome: Progressing     Problem: Delirium  Goal: Minimize duration of delirium  Description: Interventions:  - Encourage use of hearing aids, eye glasses  - Promote highest level of mobility daily  - Provide frequent reorientation  - Promote wakefulness i.e. lights on, blinds open  - Promote sleep, encourage patient's normal rest cycle i.e. lights off, TV off, minimize noise and interruptions  - Encourage family to assist in orientation and promotion of home routines  Outcome: Progressing     Problem: CARDIOVASCULAR - ADULT  Vital signs are stable , no evidence of chest pain or shortness of breath  Telemetry monitoring in  progress, NSR.   Goal: Maintains optimal cardiac output and hemodynamic stability  Description: INTERVENTIONS:  - Monitor vital signs, rhythm, and trends  - Monitor for bleeding, hypotension and signs of decreased cardiac output  - Evaluate effectiveness of vasoactive medications to optimize hemodynamic stability  - Monitor arterial and/or venous puncture sites for bleeding and/or hematoma  - Assess quality of pulses, skin color and temperature  - Assess for signs of decreased coronary artery perfusion - ex.  Angina  - Evaluate fluid balance, assess for edema, trend weights  Outcome: Progressing     Problem: METABOLIC/FLUID AND ELECTROLYTES - ADULT  Goal: Glucose maintained within prescribed range  Description: INTERVENTIONS:  - Monitor Blood Glucose as ordered  - Assess for signs and symptoms of hyperglycemia and hypoglycemia  - Administer ordered medications to maintain glucose within target range  - Assess barriers to adequate nutritional intake and initiate nutrition consult as needed  - Instruct patient on self management of diabetes  Outcome: Progressing  Goal: Electrolytes maintained within normal limits  Description: INTERVENTIONS:  - Monitor Blood Glucose as ordered  - Assess for signs and symptoms of hyperglycemia and hypoglycemia  - Administer ordered medications to maintain glucose within target range  - Assess barriers to adequate nutritional intake and initiate nutrition consult as needed  - Instruct patient on self management of diabetes  Outcome: Progressing  Goal: Hemodynamic stability and optimal renal function maintained  Description: INTERVENTIONS:  - Monitor labs and rhythm and assess patient for signs and symptoms of electrolyte imbalances  - Administer electrolyte replacement as ordered  - Monitor response to electrolyte replacements, including rhythm and repeat lab results as appropriate  - Fluid restriction as ordered  - Instruct patient on fluid and nutrition restrictions as appropriate  Outcome: Progressing     Problem: SKIN/TISSUE INTEGRITY - ADULT  Goal: Skin integrity remains intact  Thin epidermis, scattered areas  bruising on upper and abdomen. Description: INTERVENTIONS  - Assess and document risk factors for pressure ulcer development  - Assess and document skin integrity  - Monitor for areas of redness and/or skin breakdown  - Initiate interventions, skin care algorithm/standards of care as needed  Outcome: Progressing     Problem: Safety Risk - Non-Violent Restraints  Patient is not restrained at this time.   Goal: Patient will remain free from self-harm  Description: INTERVENTIONS:  - Apply the least restrictive restraint to prevent harm  - Notify patient and family of reasons restraints applied  - Assess for any contributing factors to confusion (electrolyte disturbances, delirium, medications)  - Discontinue any unnecessary medical devices as soon as possible  - Assess the patient's physical comfort, circulation, skin condition, hydration, nutrition and elimination needs   - Reorient and redirection as needed  - Assess for the need to continue restraints  Outcome: Progressing

## 2023-12-04 LAB
ANION GAP SERPL CALC-SCNC: 7 MMOL/L (ref 0–18)
BASOPHILS # BLD AUTO: 0.01 X10(3) UL (ref 0–0.2)
BASOPHILS NFR BLD AUTO: 0.1 %
BUN BLD-MCNC: 85 MG/DL (ref 9–23)
CALCIUM BLD-MCNC: 7.8 MG/DL (ref 8.5–10.1)
CHLORIDE SERPL-SCNC: 99 MMOL/L (ref 98–112)
CO2 SERPL-SCNC: 27 MMOL/L (ref 21–32)
CREAT BLD-MCNC: 2.36 MG/DL
EGFRCR SERPLBLD CKD-EPI 2021: 26 ML/MIN/1.73M2 (ref 60–?)
EOSINOPHIL # BLD AUTO: 0.05 X10(3) UL (ref 0–0.7)
EOSINOPHIL NFR BLD AUTO: 0.4 %
ERYTHROCYTE [DISTWIDTH] IN BLOOD BY AUTOMATED COUNT: 13.7 %
GLUCOSE BLD-MCNC: 125 MG/DL (ref 70–99)
GLUCOSE BLD-MCNC: 132 MG/DL (ref 70–99)
GLUCOSE BLD-MCNC: 191 MG/DL (ref 70–99)
GLUCOSE BLD-MCNC: 232 MG/DL (ref 70–99)
GLUCOSE BLD-MCNC: 301 MG/DL (ref 70–99)
GLUCOSE BLD-MCNC: 309 MG/DL (ref 70–99)
HCT VFR BLD AUTO: 27.3 %
HGB BLD-MCNC: 8.9 G/DL
IMM GRANULOCYTES # BLD AUTO: 0.07 X10(3) UL (ref 0–1)
IMM GRANULOCYTES NFR BLD: 0.6 %
LYMPHOCYTES # BLD AUTO: 0.57 X10(3) UL (ref 1–4)
LYMPHOCYTES NFR BLD AUTO: 4.8 %
MCH RBC QN AUTO: 31.8 PG (ref 26–34)
MCHC RBC AUTO-ENTMCNC: 32.6 G/DL (ref 31–37)
MCV RBC AUTO: 97.5 FL
MONOCYTES # BLD AUTO: 0.19 X10(3) UL (ref 0.1–1)
MONOCYTES NFR BLD AUTO: 1.6 %
NEUTROPHILS # BLD AUTO: 10.93 X10 (3) UL (ref 1.5–7.7)
NEUTROPHILS # BLD AUTO: 10.93 X10(3) UL (ref 1.5–7.7)
NEUTROPHILS NFR BLD AUTO: 92.5 %
OSMOLALITY SERPL CALC.SUM OF ELEC: 313 MOSM/KG (ref 275–295)
PLATELET # BLD AUTO: 248 10(3)UL (ref 150–450)
POTASSIUM SERPL-SCNC: 4.2 MMOL/L (ref 3.5–5.1)
RBC # BLD AUTO: 2.8 X10(6)UL
SODIUM SERPL-SCNC: 133 MMOL/L (ref 136–145)
WBC # BLD AUTO: 11.8 X10(3) UL (ref 4–11)

## 2023-12-04 PROCEDURE — 99231 SBSQ HOSP IP/OBS SF/LOW 25: CPT | Performed by: CLINICAL NURSE SPECIALIST

## 2023-12-04 RX ORDER — DEXTROSE MONOHYDRATE 50 MG/ML
INJECTION, SOLUTION INTRAVENOUS CONTINUOUS
Status: DISCONTINUED | OUTPATIENT
Start: 2023-12-04 | End: 2023-12-04

## 2023-12-04 RX ORDER — DEXTROSE, SODIUM CHLORIDE, SODIUM LACTATE, POTASSIUM CHLORIDE, AND CALCIUM CHLORIDE 5; .6; .31; .03; .02 G/100ML; G/100ML; G/100ML; G/100ML; G/100ML
INJECTION, SOLUTION INTRAVENOUS CONTINUOUS
Status: DISCONTINUED | OUTPATIENT
Start: 2023-12-04 | End: 2023-12-05

## 2023-12-04 NOTE — PLAN OF CARE
Assumed care of patient at 0700. Pt very lethargic, nonverbal at baseline, withdraws from pain. O2 sats maintained on room air. NSR on tele. Last BM 12/3, incontinent. Male purewick in place. Total care provided. Care needs met. Bed in lowest position, Call light within reach. Tube feeds through dobhoff at goal rate with Q4 hr flushes. Strict NPO. POC: decision about PEG tube, IV antbiotic, LR @100mL/hr    1823: pt partially removed dobhoff. Was at 55cm, now at 29cm. Tube feeding stopped. Dr. Dario Woods notified, awaiting response. Dr. Halima Reece paged regarding dobhoff being removed. Order to hold tube feeding tonight, D5in LR at 100mL/hr, hold Levemir, and continue sliding scale and Q6 accuchecks. Son, Mariam Rascon updated over the phone.          Problem: Diabetes/Glucose Control  Goal: Glucose maintained within prescribed range  Description: INTERVENTIONS:  - Monitor Blood Glucose as ordered  - Assess for signs and symptoms of hyperglycemia and hypoglycemia  - Administer ordered medications to maintain glucose within target range  - Assess barriers to adequate nutritional intake and initiate nutrition consult as needed  - Instruct patient on self management of diabetes  Outcome: Progressing     Problem: Patient/Family Goals  Goal: Patient/Family Long Term Goal  Description: Patient's Long Term Goal: stay out of hospital    Interventions:  - med compliance, follow ups  - trend labs/sugars/vitals    - See additional Care Plan goals for specific interventions  Outcome: Progressing  Goal: Patient/Family Short Term Goal  Description: Patient's Short Term Goal: sugar control    Interventions:   - trend sugars Q6, tube feedings, insulin protocol, fluids    - See additional Care Plan goals for specific interventions  Outcome: Progressing     Problem: Delirium  Goal: Minimize duration of delirium  Description: Interventions:  - Encourage use of hearing aids, eye glasses  - Promote highest level of mobility daily  - Provide frequent reorientation  - Promote wakefulness i.e. lights on, blinds open  - Promote sleep, encourage patient's normal rest cycle i.e. lights off, TV off, minimize noise and interruptions  - Encourage family to assist in orientation and promotion of home routines  Outcome: Progressing     Problem: CARDIOVASCULAR - ADULT  Goal: Maintains optimal cardiac output and hemodynamic stability  Description: INTERVENTIONS:  - Monitor vital signs, rhythm, and trends  - Monitor for bleeding, hypotension and signs of decreased cardiac output  - Evaluate effectiveness of vasoactive medications to optimize hemodynamic stability  - Monitor arterial and/or venous puncture sites for bleeding and/or hematoma  - Assess quality of pulses, skin color and temperature  - Assess for signs of decreased coronary artery perfusion - ex.  Angina  - Evaluate fluid balance, assess for edema, trend weights  Outcome: Progressing     Problem: METABOLIC/FLUID AND ELECTROLYTES - ADULT  Goal: Glucose maintained within prescribed range  Description: INTERVENTIONS:  - Monitor Blood Glucose as ordered  - Assess for signs and symptoms of hyperglycemia and hypoglycemia  - Administer ordered medications to maintain glucose within target range  - Assess barriers to adequate nutritional intake and initiate nutrition consult as needed  - Instruct patient on self management of diabetes  Outcome: Progressing  Goal: Electrolytes maintained within normal limits  Description: INTERVENTIONS:  - Monitor labs and rhythm and assess patient for signs and symptoms of electrolyte imbalances  - Administer electrolyte replacement as ordered  - Monitor response to electrolyte replacements, including rhythm and repeat lab results as appropriate  - Fluid restriction as ordered  - Instruct patient on fluid and nutrition restrictions as appropriate  Outcome: Progressing  Goal: Hemodynamic stability and optimal renal function maintained  Description: INTERVENTIONS:  - Monitor labs and assess for signs and symptoms of volume excess or deficit  - Monitor intake, output and patient weight  - Monitor urine specific gravity, serum osmolarity and serum sodium as indicated or ordered  - Monitor response to interventions for patient's volume status, including labs, urine output, blood pressure (other measures as available)  - Encourage oral intake as appropriate  - Instruct patient on fluid and nutrition restrictions as appropriate  Outcome: Progressing     Problem: SKIN/TISSUE INTEGRITY - ADULT  Goal: Skin integrity remains intact  Description: INTERVENTIONS  - Assess and document risk factors for pressure ulcer development  - Assess and document skin integrity  - Monitor for areas of redness and/or skin breakdown  - Initiate interventions, skin care algorithm/standards of care as needed  Outcome: Progressing     Problem: Safety Risk - Non-Violent Restraints  Goal: Patient will remain free from self-harm  Description: INTERVENTIONS:  - Apply the least restrictive restraint to prevent harm  - Notify patient and family of reasons restraints applied  - Assess for any contributing factors to confusion (electrolyte disturbances, delirium, medications)  - Discontinue any unnecessary medical devices as soon as possible  - Assess the patient's physical comfort, circulation, skin condition, hydration, nutrition and elimination needs   - Reorient and redirection as needed  - Assess for the need to continue restraints  Outcome: Progressing

## 2023-12-04 NOTE — PLAN OF CARE
Assumed care of pt at [address]. Non verbal. No apparent distress noted. On RA. NSR on tele. Incontinent of bowel and bladder. NG tube in place, TF. Total assist. Fall precautions in place. Q6H accucheck.       Problem: Diabetes/Glucose Control  Goal: Glucose maintained within prescribed range  Description: INTERVENTIONS:  - Monitor Blood Glucose as ordered  - Assess for signs and symptoms of hyperglycemia and hypoglycemia  - Administer ordered medications to maintain glucose within target range  - Assess barriers to adequate nutritional intake and initiate nutrition consult as needed  - Instruct patient on self management of diabetes  Outcome: Progressing     Problem: Delirium  Goal: Minimize duration of delirium  Description: Interventions:  - Encourage use of hearing aids, eye glasses  - Promote highest level of mobility daily  - Provide frequent reorientation  - Promote wakefulness i.e. lights on, blinds open  - Promote sleep, encourage patient's normal rest cycle i.e. lights off, TV off, minimize noise and interruptions  - Encourage family to assist in orientation and promotion of home routines  Outcome: Progressing

## 2023-12-05 ENCOUNTER — APPOINTMENT (OUTPATIENT)
Dept: GENERAL RADIOLOGY | Facility: HOSPITAL | Age: 88
End: 2023-12-05
Attending: INTERNAL MEDICINE
Payer: MEDICARE

## 2023-12-05 LAB
ANION GAP SERPL CALC-SCNC: 6 MMOL/L (ref 0–18)
BASOPHILS # BLD AUTO: 0.02 X10(3) UL (ref 0–0.2)
BASOPHILS NFR BLD AUTO: 0.2 %
BUN BLD-MCNC: 72 MG/DL (ref 9–23)
CALCIUM BLD-MCNC: 8.3 MG/DL (ref 8.5–10.1)
CHLORIDE SERPL-SCNC: 103 MMOL/L (ref 98–112)
CO2 SERPL-SCNC: 29 MMOL/L (ref 21–32)
CREAT BLD-MCNC: 2.28 MG/DL
EGFRCR SERPLBLD CKD-EPI 2021: 27 ML/MIN/1.73M2 (ref 60–?)
EOSINOPHIL # BLD AUTO: 0.08 X10(3) UL (ref 0–0.7)
EOSINOPHIL NFR BLD AUTO: 0.7 %
ERYTHROCYTE [DISTWIDTH] IN BLOOD BY AUTOMATED COUNT: 13.8 %
GLUCOSE BLD-MCNC: 161 MG/DL (ref 70–99)
GLUCOSE BLD-MCNC: 163 MG/DL (ref 70–99)
GLUCOSE BLD-MCNC: 192 MG/DL (ref 70–99)
GLUCOSE BLD-MCNC: 202 MG/DL (ref 70–99)
GLUCOSE BLD-MCNC: 204 MG/DL (ref 70–99)
GLUCOSE BLD-MCNC: 236 MG/DL (ref 70–99)
HCT VFR BLD AUTO: 25.3 %
HGB BLD-MCNC: 8.1 G/DL
IMM GRANULOCYTES # BLD AUTO: 0.08 X10(3) UL (ref 0–1)
IMM GRANULOCYTES NFR BLD: 0.7 %
LYMPHOCYTES # BLD AUTO: 0.96 X10(3) UL (ref 1–4)
LYMPHOCYTES NFR BLD AUTO: 8.1 %
MCH RBC QN AUTO: 31.3 PG (ref 26–34)
MCHC RBC AUTO-ENTMCNC: 32 G/DL (ref 31–37)
MCV RBC AUTO: 97.7 FL
MONOCYTES # BLD AUTO: 0.42 X10(3) UL (ref 0.1–1)
MONOCYTES NFR BLD AUTO: 3.5 %
NEUTROPHILS # BLD AUTO: 10.3 X10 (3) UL (ref 1.5–7.7)
NEUTROPHILS # BLD AUTO: 10.3 X10(3) UL (ref 1.5–7.7)
NEUTROPHILS NFR BLD AUTO: 86.8 %
OSMOLALITY SERPL CALC.SUM OF ELEC: 312 MOSM/KG (ref 275–295)
PLATELET # BLD AUTO: 309 10(3)UL (ref 150–450)
POTASSIUM SERPL-SCNC: 4 MMOL/L (ref 3.5–5.1)
RBC # BLD AUTO: 2.59 X10(6)UL
SODIUM SERPL-SCNC: 138 MMOL/L (ref 136–145)
WBC # BLD AUTO: 11.9 X10(3) UL (ref 4–11)

## 2023-12-05 PROCEDURE — 71045 X-RAY EXAM CHEST 1 VIEW: CPT | Performed by: INTERNAL MEDICINE

## 2023-12-05 PROCEDURE — 99231 SBSQ HOSP IP/OBS SF/LOW 25: CPT | Performed by: CLINICAL NURSE SPECIALIST

## 2023-12-05 NOTE — PROGRESS NOTES
Pt had pulled out NG very high risk of pulling out PEG with catastrophic consequences. Given clinical setting of grave expected outcome and plans for [facility], I do not feel that PEG is an appropriate intervention to temporize care for a short period of time. Will still await family decision.

## 2023-12-05 NOTE — PLAN OF CARE
Assumed care of patient at 0700. Pt very lethargic, nonverbal at baseline, withdraws from pain. O2 sats maintained on room air. NSR on tele. Last BM 12/4, incontinent. Male purewick in place. Total care provided. Care needs met. Bed in lowest position, Call light within reach. Dobhoff NG tube inserted, verified placement with x-ray. Tube secured at 64cm. Tube feeds started at 25mL/hour and being increased to goal rate of 45mL/hr as tolerated. 100mL Q4 hr flushes. Strict NPO. LR @ 75mL/hr. Son, Gagandeep Guadarrama, updated over the phone. POC: IV ABX, LR @75mL/hr, Tube feeds continuously, increasing to goal rate.        Problem: Diabetes/Glucose Control  Goal: Glucose maintained within prescribed range  Description: INTERVENTIONS:  - Monitor Blood Glucose as ordered  - Assess for signs and symptoms of hyperglycemia and hypoglycemia  - Administer ordered medications to maintain glucose within target range  - Assess barriers to adequate nutritional intake and initiate nutrition consult as needed  - Instruct patient on self management of diabetes  Outcome: Progressing     Problem: Patient/Family Goals  Goal: Patient/Family Long Term Goal  Description: Patient's Long Term Goal: stay out of hospital    Interventions:  - med compliance, follow ups  - trend labs/sugars/vitals    - See additional Care Plan goals for specific interventions  Outcome: Progressing  Goal: Patient/Family Short Term Goal  Description: Patient's Short Term Goal: sugar control    Interventions:   - trend sugars Q6, tube feedings, insulin protocol, fluids    - See additional Care Plan goals for specific interventions  Outcome: Progressing     Problem: Delirium  Goal: Minimize duration of delirium  Description: Interventions:  - Encourage use of hearing aids, eye glasses  - Promote highest level of mobility daily  - Provide frequent reorientation  - Promote wakefulness i.e. lights on, blinds open  - Promote sleep, encourage patient's normal rest cycle i.e. lights off, TV off, minimize noise and interruptions  - Encourage family to assist in orientation and promotion of home routines  Outcome: Progressing     Problem: CARDIOVASCULAR - ADULT  Goal: Maintains optimal cardiac output and hemodynamic stability  Description: INTERVENTIONS:  - Monitor vital signs, rhythm, and trends  - Monitor for bleeding, hypotension and signs of decreased cardiac output  - Evaluate effectiveness of vasoactive medications to optimize hemodynamic stability  - Monitor arterial and/or venous puncture sites for bleeding and/or hematoma  - Assess quality of pulses, skin color and temperature  - Assess for signs of decreased coronary artery perfusion - ex.  Angina  - Evaluate fluid balance, assess for edema, trend weights  Outcome: Progressing     Problem: METABOLIC/FLUID AND ELECTROLYTES - ADULT  Goal: Glucose maintained within prescribed range  Description: INTERVENTIONS:  - Monitor Blood Glucose as ordered  - Assess for signs and symptoms of hyperglycemia and hypoglycemia  - Administer ordered medications to maintain glucose within target range  - Assess barriers to adequate nutritional intake and initiate nutrition consult as needed  - Instruct patient on self management of diabetes  Outcome: Progressing  Goal: Electrolytes maintained within normal limits  Description: INTERVENTIONS:  - Monitor labs and rhythm and assess patient for signs and symptoms of electrolyte imbalances  - Administer electrolyte replacement as ordered  - Monitor response to electrolyte replacements, including rhythm and repeat lab results as appropriate  - Fluid restriction as ordered  - Instruct patient on fluid and nutrition restrictions as appropriate  Outcome: Progressing  Goal: Hemodynamic stability and optimal renal function maintained  Description: INTERVENTIONS:  - Monitor labs and assess for signs and symptoms of volume excess or deficit  - Monitor intake, output and patient weight  - Monitor urine specific gravity, serum osmolarity and serum sodium as indicated or ordered  - Monitor response to interventions for patient's volume status, including labs, urine output, blood pressure (other measures as available)  - Encourage oral intake as appropriate  - Instruct patient on fluid and nutrition restrictions as appropriate  Outcome: Progressing     Problem: SKIN/TISSUE INTEGRITY - ADULT  Goal: Skin integrity remains intact  Description: INTERVENTIONS  - Assess and document risk factors for pressure ulcer development  - Assess and document skin integrity  - Monitor for areas of redness and/or skin breakdown  - Initiate interventions, skin care algorithm/standards of care as needed  Outcome: Progressing     Problem: Safety Risk - Non-Violent Restraints  Goal: Patient will remain free from self-harm  Description: INTERVENTIONS:  - Apply the least restrictive restraint to prevent harm  - Notify patient and family of reasons restraints applied  - Assess for any contributing factors to confusion (electrolyte disturbances, delirium, medications)  - Discontinue any unnecessary medical devices as soon as possible  - Assess the patient's physical comfort, circulation, skin condition, hydration, nutrition and elimination needs   - Reorient and redirection as needed  - Assess for the need to continue restraints  Outcome: Progressing

## 2023-12-05 NOTE — PLAN OF CARE
Assumed care for pt. At [address]. Pt. Is lethargic but opens eyes to verbal/tactile stimulation. Non-verbal. Unable to assess orientation. Lungs diminished on room air. Occasional non-productive cough. NSR on tele. /50's. Incontinent, brief in place. Bunny boots in place, turn q2h. Mepilex in place to sacrum. Tube feeds on hold overnight until family re-evaulates with MD in the morning. IVF infusing as ordered. Accuchecks every 6 hours. Frequent oral care provided. Will continue to monitor.        Problem: Diabetes/Glucose Control  Goal: Glucose maintained within prescribed range  Description: INTERVENTIONS:  - Monitor Blood Glucose as ordered  - Assess for signs and symptoms of hyperglycemia and hypoglycemia  - Administer ordered medications to maintain glucose within target range  - Assess barriers to adequate nutritional intake and initiate nutrition consult as needed  - Instruct patient on self management of diabetes  Outcome: Progressing     Problem: Patient/Family Goals  Goal: Patient/Family Long Term Goal  Description: Patient's Long Term Goal: stay out of hospital    Interventions:  - med compliance, follow ups  - trend labs/sugars/vitals    - See additional Care Plan goals for specific interventions  Outcome: Progressing  Goal: Patient/Family Short Term Goal  Description: Patient's Short Term Goal: sugar control    Interventions:   - trend sugars Q6, tube feedings, insulin protocol, fluids    - See additional Care Plan goals for specific interventions  Outcome: Progressing     Problem: Delirium  Goal: Minimize duration of delirium  Description: Interventions:  - Encourage use of hearing aids, eye glasses  - Promote highest level of mobility daily  - Provide frequent reorientation  - Promote wakefulness i.e. lights on, blinds open  - Promote sleep, encourage patient's normal rest cycle i.e. lights off, TV off, minimize noise and interruptions  - Encourage family to assist in orientation and promotion of home routines  Outcome: Progressing     Problem: CARDIOVASCULAR - ADULT  Goal: Maintains optimal cardiac output and hemodynamic stability  Description: INTERVENTIONS:  - Monitor vital signs, rhythm, and trends  - Monitor for bleeding, hypotension and signs of decreased cardiac output  - Evaluate effectiveness of vasoactive medications to optimize hemodynamic stability  - Monitor arterial and/or venous puncture sites for bleeding and/or hematoma  - Assess quality of pulses, skin color and temperature  - Assess for signs of decreased coronary artery perfusion - ex.  Angina  - Evaluate fluid balance, assess for edema, trend weights  Outcome: Progressing     Problem: METABOLIC/FLUID AND ELECTROLYTES - ADULT  Goal: Glucose maintained within prescribed range  Description: INTERVENTIONS:  - Monitor Blood Glucose as ordered  - Assess for signs and symptoms of hyperglycemia and hypoglycemia  - Administer ordered medications to maintain glucose within target range  - Assess barriers to adequate nutritional intake and initiate nutrition consult as needed  - Instruct patient on self management of diabetes  Outcome: Progressing  Goal: Electrolytes maintained within normal limits  Description: INTERVENTIONS:  - Monitor labs and rhythm and assess patient for signs and symptoms of electrolyte imbalances  - Administer electrolyte replacement as ordered  - Monitor response to electrolyte replacements, including rhythm and repeat lab results as appropriate  - Fluid restriction as ordered  - Instruct patient on fluid and nutrition restrictions as appropriate  Outcome: Progressing  Goal: Hemodynamic stability and optimal renal function maintained  Description: INTERVENTIONS:  - Monitor labs and assess for signs and symptoms of volume excess or deficit  - Monitor intake, output and patient weight  - Monitor urine specific gravity, serum osmolarity and serum sodium as indicated or ordered  - Monitor response to interventions for patient's volume status, including labs, urine output, blood pressure (other measures as available)  - Encourage oral intake as appropriate  - Instruct patient on fluid and nutrition restrictions as appropriate  Outcome: Progressing     Problem: SKIN/TISSUE INTEGRITY - ADULT  Goal: Skin integrity remains intact  Description: INTERVENTIONS  - Assess and document risk factors for pressure ulcer development  - Assess and document skin integrity  - Monitor for areas of redness and/or skin breakdown  - Initiate interventions, skin care algorithm/standards of care as needed  Outcome: Progressing     Problem: Safety Risk - Non-Violent Restraints  Goal: Patient will remain free from self-harm  Description: INTERVENTIONS:  - Apply the least restrictive restraint to prevent harm  - Notify patient and family of reasons restraints applied  - Assess for any contributing factors to confusion (electrolyte disturbances, delirium, medications)  - Discontinue any unnecessary medical devices as soon as possible  - Assess the patient's physical comfort, circulation, skin condition, hydration, nutrition and elimination needs   - Reorient and redirection as needed  - Assess for the need to continue restraints  Outcome: Progressing

## 2023-12-06 LAB
ANION GAP SERPL CALC-SCNC: 8 MMOL/L (ref 0–18)
BASOPHILS # BLD AUTO: 0.02 X10(3) UL (ref 0–0.2)
BASOPHILS NFR BLD AUTO: 0.2 %
BUN BLD-MCNC: 63 MG/DL (ref 9–23)
CALCIUM BLD-MCNC: 8.4 MG/DL (ref 8.5–10.1)
CHLORIDE SERPL-SCNC: 105 MMOL/L (ref 98–112)
CO2 SERPL-SCNC: 29 MMOL/L (ref 21–32)
CREAT BLD-MCNC: 2.07 MG/DL
EGFRCR SERPLBLD CKD-EPI 2021: 30 ML/MIN/1.73M2 (ref 60–?)
EOSINOPHIL # BLD AUTO: 0.1 X10(3) UL (ref 0–0.7)
EOSINOPHIL NFR BLD AUTO: 0.8 %
ERYTHROCYTE [DISTWIDTH] IN BLOOD BY AUTOMATED COUNT: 13.6 %
GLUCOSE BLD-MCNC: 175 MG/DL (ref 70–99)
GLUCOSE BLD-MCNC: 195 MG/DL (ref 70–99)
GLUCOSE BLD-MCNC: 206 MG/DL (ref 70–99)
GLUCOSE BLD-MCNC: 226 MG/DL (ref 70–99)
GLUCOSE BLD-MCNC: 77 MG/DL (ref 70–99)
GLUCOSE BLD-MCNC: 78 MG/DL (ref 70–99)
GLUCOSE BLD-MCNC: 81 MG/DL (ref 70–99)
HCT VFR BLD AUTO: 24.3 %
HGB BLD-MCNC: 7.7 G/DL
IMM GRANULOCYTES # BLD AUTO: 0.07 X10(3) UL (ref 0–1)
IMM GRANULOCYTES NFR BLD: 0.6 %
LYMPHOCYTES # BLD AUTO: 1.16 X10(3) UL (ref 1–4)
LYMPHOCYTES NFR BLD AUTO: 9.7 %
MCH RBC QN AUTO: 31.2 PG (ref 26–34)
MCHC RBC AUTO-ENTMCNC: 31.7 G/DL (ref 31–37)
MCV RBC AUTO: 98.4 FL
MONOCYTES # BLD AUTO: 0.51 X10(3) UL (ref 0.1–1)
MONOCYTES NFR BLD AUTO: 4.3 %
NEUTROPHILS # BLD AUTO: 10.13 X10 (3) UL (ref 1.5–7.7)
NEUTROPHILS # BLD AUTO: 10.13 X10(3) UL (ref 1.5–7.7)
NEUTROPHILS NFR BLD AUTO: 84.4 %
OSMOLALITY SERPL CALC.SUM OF ELEC: 311 MOSM/KG (ref 275–295)
PLATELET # BLD AUTO: 350 10(3)UL (ref 150–450)
POTASSIUM SERPL-SCNC: 3.8 MMOL/L (ref 3.5–5.1)
RBC # BLD AUTO: 2.47 X10(6)UL
SODIUM SERPL-SCNC: 142 MMOL/L (ref 136–145)
WBC # BLD AUTO: 12 X10(3) UL (ref 4–11)

## 2023-12-06 NOTE — PLAN OF CARE
Pt somnolent, nonverbal, withdraw from pain stimulate. On room air, NSR on tele. Breath sound diminished, weak/congested cough. No BM today. Incontinent-male purewick in place. NPO. Dobhoff NG tube inserted, tube secured at 64 cm. Tube feeding goal at 45ml/hr if pt tolerate. 100 ML flush q4 hours. LR infusing at 75 ml/hr continuous. Bed alarm on, call light within reach. All needs meet at this time   POC;   LR AT 75 ml/hr continuous  Feeding continuous at order, started 25 ml/hr then increase to 45 ml/hr (goal rate) if pt tolerate well.      Problem: Diabetes/Glucose Control  Goal: Glucose maintained within prescribed range  Description: INTERVENTIONS:  - Monitor Blood Glucose as ordered  - Assess for signs and symptoms of hyperglycemia and hypoglycemia  - Administer ordered medications to maintain glucose within target range  - Assess barriers to adequate nutritional intake and initiate nutrition consult as needed  - Instruct patient on self management of diabetes  Outcome: Progressing     Problem: Patient/Family Goals  Goal: Patient/Family Long Term Goal  Description: Patient's Long Term Goal: stay out of hospital    Interventions:  - med compliance, follow ups  - trend labs/sugars/vitals    - See additional Care Plan goals for specific interventions  Outcome: Progressing  Goal: Patient/Family Short Term Goal  Description: Patient's Short Term Goal: sugar control    Interventions:   - trend sugars Q6, tube feedings, insulin protocol, fluids    - See additional Care Plan goals for specific interventions  Outcome: Progressing     Problem: Delirium  Goal: Minimize duration of delirium  Description: Interventions:  - Encourage use of hearing aids, eye glasses  - Promote highest level of mobility daily  - Provide frequent reorientation  - Promote wakefulness i.e. lights on, blinds open  - Promote sleep, encourage patient's normal rest cycle i.e. lights off, TV off, minimize noise and interruptions  - Encourage family to assist in orientation and promotion of home routines  Outcome: Progressing     Problem: CARDIOVASCULAR - ADULT  Goal: Maintains optimal cardiac output and hemodynamic stability  Description: INTERVENTIONS:  - Monitor vital signs, rhythm, and trends  - Monitor for bleeding, hypotension and signs of decreased cardiac output  - Evaluate effectiveness of vasoactive medications to optimize hemodynamic stability  - Monitor arterial and/or venous puncture sites for bleeding and/or hematoma  - Assess quality of pulses, skin color and temperature  - Assess for signs of decreased coronary artery perfusion - ex.  Angina  - Evaluate fluid balance, assess for edema, trend weights  Outcome: Progressing     Problem: METABOLIC/FLUID AND ELECTROLYTES - ADULT  Goal: Glucose maintained within prescribed range  Description: INTERVENTIONS:  - Monitor Blood Glucose as ordered  - Assess for signs and symptoms of hyperglycemia and hypoglycemia  - Administer ordered medications to maintain glucose within target range  - Assess barriers to adequate nutritional intake and initiate nutrition consult as needed  - Instruct patient on self management of diabetes  Outcome: Progressing  Goal: Electrolytes maintained within normal limits  Description: INTERVENTIONS:  - Monitor labs and rhythm and assess patient for signs and symptoms of electrolyte imbalances  - Administer electrolyte replacement as ordered  - Monitor response to electrolyte replacements, including rhythm and repeat lab results as appropriate  - Fluid restriction as ordered  - Instruct patient on fluid and nutrition restrictions as appropriate  Outcome: Progressing  Goal: Hemodynamic stability and optimal renal function maintained  Description: INTERVENTIONS:  - Monitor labs and assess for signs and symptoms of volume excess or deficit  - Monitor intake, output and patient weight  - Monitor urine specific gravity, serum osmolarity and serum sodium as indicated or ordered  - Monitor response to interventions for patient's volume status, including labs, urine output, blood pressure (other measures as available)  - Encourage oral intake as appropriate  - Instruct patient on fluid and nutrition restrictions as appropriate  Outcome: Progressing     Problem: SKIN/TISSUE INTEGRITY - ADULT  Goal: Skin integrity remains intact  Description: INTERVENTIONS  - Assess and document risk factors for pressure ulcer development  - Assess and document skin integrity  - Monitor for areas of redness and/or skin breakdown  - Initiate interventions, skin care algorithm/standards of care as needed  Outcome: Progressing     Problem: Safety Risk - Non-Violent Restraints  Goal: Patient will remain free from self-harm  Description: INTERVENTIONS:  - Apply the least restrictive restraint to prevent harm  - Notify patient and family of reasons restraints applied  - Assess for any contributing factors to confusion (electrolyte disturbances, delirium, medications)  - Discontinue any unnecessary medical devices as soon as possible  - Assess the patient's physical comfort, circulation, skin condition, hydration, nutrition and elimination needs   - Reorient and redirection as needed  - Assess for the need to continue restraints  Outcome: Progressing     Problem: Diabetes/Glucose Control  Goal: Glucose maintained within prescribed range  Description: INTERVENTIONS:  - Monitor Blood Glucose as ordered  - Assess for signs and symptoms of hyperglycemia and hypoglycemia  - Administer ordered medications to maintain glucose within target range  - Assess barriers to adequate nutritional intake and initiate nutrition consult as needed  - Instruct patient on self management of diabetes  Outcome: Progressing     Problem: Patient/Family Goals  Goal: Patient/Family Long Term Goal  Description: Patient's Long Term Goal: stay out of hospital    Interventions:  - med compliance, follow ups  - trend labs/sugars/vitals    - See additional Care Plan goals for specific interventions  Outcome: Progressing  Goal: Patient/Family Short Term Goal  Description: Patient's Short Term Goal: sugar control    Interventions:   - trend sugars Q6, tube feedings, insulin protocol, fluids    - See additional Care Plan goals for specific interventions  Outcome: Progressing     Problem: Delirium  Goal: Minimize duration of delirium  Description: Interventions:  - Encourage use of hearing aids, eye glasses  - Promote highest level of mobility daily  - Provide frequent reorientation  - Promote wakefulness i.e. lights on, blinds open  - Promote sleep, encourage patient's normal rest cycle i.e. lights off, TV off, minimize noise and interruptions  - Encourage family to assist in orientation and promotion of home routines  Outcome: Progressing     Problem: CARDIOVASCULAR - ADULT  Goal: Maintains optimal cardiac output and hemodynamic stability  Description: INTERVENTIONS:  - Monitor vital signs, rhythm, and trends  - Monitor for bleeding, hypotension and signs of decreased cardiac output  - Evaluate effectiveness of vasoactive medications to optimize hemodynamic stability  - Monitor arterial and/or venous puncture sites for bleeding and/or hematoma  - Assess quality of pulses, skin color and temperature  - Assess for signs of decreased coronary artery perfusion - ex.  Angina  - Evaluate fluid balance, assess for edema, trend weights  Outcome: Progressing     Problem: METABOLIC/FLUID AND ELECTROLYTES - ADULT  Goal: Glucose maintained within prescribed range  Description: INTERVENTIONS:  - Monitor Blood Glucose as ordered  - Assess for signs and symptoms of hyperglycemia and hypoglycemia  - Administer ordered medications to maintain glucose within target range  - Assess barriers to adequate nutritional intake and initiate nutrition consult as needed  - Instruct patient on self management of diabetes  Outcome: Progressing  Goal: Electrolytes maintained within normal limits  Description: INTERVENTIONS:  - Monitor labs and rhythm and assess patient for signs and symptoms of electrolyte imbalances  - Administer electrolyte replacement as ordered  - Monitor response to electrolyte replacements, including rhythm and repeat lab results as appropriate  - Fluid restriction as ordered  - Instruct patient on fluid and nutrition restrictions as appropriate  Outcome: Progressing  Goal: Hemodynamic stability and optimal renal function maintained  Description: INTERVENTIONS:  - Monitor labs and assess for signs and symptoms of volume excess or deficit  - Monitor intake, output and patient weight  - Monitor urine specific gravity, serum osmolarity and serum sodium as indicated or ordered  - Monitor response to interventions for patient's volume status, including labs, urine output, blood pressure (other measures as available)  - Encourage oral intake as appropriate  - Instruct patient on fluid and nutrition restrictions as appropriate  Outcome: Progressing     Problem: SKIN/TISSUE INTEGRITY - ADULT  Goal: Skin integrity remains intact  Description: INTERVENTIONS  - Assess and document risk factors for pressure ulcer development  - Assess and document skin integrity  - Monitor for areas of redness and/or skin breakdown  - Initiate interventions, skin care algorithm/standards of care as needed  Outcome: Progressing     Problem: Safety Risk - Non-Violent Restraints  Goal: Patient will remain free from self-harm  Description: INTERVENTIONS:  - Apply the least restrictive restraint to prevent harm  - Notify patient and family of reasons restraints applied  - Assess for any contributing factors to confusion (electrolyte disturbances, delirium, medications)  - Discontinue any unnecessary medical devices as soon as possible  - Assess the patient's physical comfort, circulation, skin condition, hydration, nutrition and elimination needs   - Reorient and redirection as needed  - Assess for the need to continue restraints  Outcome: Progressing     Problem: Diabetes/Glucose Control  Goal: Glucose maintained within prescribed range  Description: INTERVENTIONS:  - Monitor Blood Glucose as ordered  - Assess for signs and symptoms of hyperglycemia and hypoglycemia  - Administer ordered medications to maintain glucose within target range  - Assess barriers to adequate nutritional intake and initiate nutrition consult as needed  - Instruct patient on self management of diabetes  Outcome: Progressing     Problem: Patient/Family Goals  Goal: Patient/Family Long Term Goal  Description: Patient's Long Term Goal: stay out of hospital    Interventions:  - med compliance, follow ups  - trend labs/sugars/vitals    - See additional Care Plan goals for specific interventions  Outcome: Progressing  Goal: Patient/Family Short Term Goal  Description: Patient's Short Term Goal: sugar control    Interventions:   - trend sugars Q6, tube feedings, insulin protocol, fluids    - See additional Care Plan goals for specific interventions  Outcome: Progressing     Problem: Delirium  Goal: Minimize duration of delirium  Description: Interventions:  - Encourage use of hearing aids, eye glasses  - Promote highest level of mobility daily  - Provide frequent reorientation  - Promote wakefulness i.e. lights on, blinds open  - Promote sleep, encourage patient's normal rest cycle i.e. lights off, TV off, minimize noise and interruptions  - Encourage family to assist in orientation and promotion of home routines  Outcome: Progressing     Problem: CARDIOVASCULAR - ADULT  Goal: Maintains optimal cardiac output and hemodynamic stability  Description: INTERVENTIONS:  - Monitor vital signs, rhythm, and trends  - Monitor for bleeding, hypotension and signs of decreased cardiac output  - Evaluate effectiveness of vasoactive medications to optimize hemodynamic stability  - Monitor arterial and/or venous puncture sites for bleeding and/or hematoma  - Assess quality of pulses, skin color and temperature  - Assess for signs of decreased coronary artery perfusion - ex.  Angina  - Evaluate fluid balance, assess for edema, trend weights  Outcome: Progressing     Problem: METABOLIC/FLUID AND ELECTROLYTES - ADULT  Goal: Glucose maintained within prescribed range  Description: INTERVENTIONS:  - Monitor Blood Glucose as ordered  - Assess for signs and symptoms of hyperglycemia and hypoglycemia  - Administer ordered medications to maintain glucose within target range  - Assess barriers to adequate nutritional intake and initiate nutrition consult as needed  - Instruct patient on self management of diabetes  Outcome: Progressing  Goal: Electrolytes maintained within normal limits  Description: INTERVENTIONS:  - Monitor labs and rhythm and assess patient for signs and symptoms of electrolyte imbalances  - Administer electrolyte replacement as ordered  - Monitor response to electrolyte replacements, including rhythm and repeat lab results as appropriate  - Fluid restriction as ordered  - Instruct patient on fluid and nutrition restrictions as appropriate  Outcome: Progressing  Goal: Hemodynamic stability and optimal renal function maintained  Description: INTERVENTIONS:  - Monitor labs and assess for signs and symptoms of volume excess or deficit  - Monitor intake, output and patient weight  - Monitor urine specific gravity, serum osmolarity and serum sodium as indicated or ordered  - Monitor response to interventions for patient's volume status, including labs, urine output, blood pressure (other measures as available)  - Encourage oral intake as appropriate  - Instruct patient on fluid and nutrition restrictions as appropriate  Outcome: Progressing     Problem: SKIN/TISSUE INTEGRITY - ADULT  Goal: Skin integrity remains intact  Description: INTERVENTIONS  - Assess and document risk factors for pressure ulcer development  - Assess and document skin integrity  - Monitor for areas of redness and/or skin breakdown  - Initiate interventions, skin care algorithm/standards of care as needed  Outcome: Progressing     Problem: Safety Risk - Non-Violent Restraints  Goal: Patient will remain free from self-harm  Description: INTERVENTIONS:  - Apply the least restrictive restraint to prevent harm  - Notify patient and family of reasons restraints applied  - Assess for any contributing factors to confusion (electrolyte disturbances, delirium, medications)  - Discontinue any unnecessary medical devices as soon as possible  - Assess the patient's physical comfort, circulation, skin condition, hydration, nutrition and elimination needs   - Reorient and redirection as needed  - Assess for the need to continue restraints  Outcome: Progressing

## 2023-12-06 NOTE — PLAN OF CARE
Received pt at 0700. Pt lethargic, non-verbal at baseline. He is on room air, lungs are diminished, weak/dry cough. Pt is in NSR. Edema present at [address]. Dobbhoff in place at 64 cm, tube feeds at 45 ml/hour with 100 ml flushes every 4 hours. LR running at 75 ml/hour. Pt has a male purewick in place, abdomen non-tender, last BM 12-4.       Problem: Diabetes/Glucose Control  Goal: Glucose maintained within prescribed range  Description: INTERVENTIONS:  - Monitor Blood Glucose as ordered  - Assess for signs and symptoms of hyperglycemia and hypoglycemia  - Administer ordered medications to maintain glucose within target range  - Assess barriers to adequate nutritional intake and initiate nutrition consult as needed  - Instruct patient on self management of diabetes  Outcome: Not Progressing     Problem: Patient/Family Goals  Goal: Patient/Family Long Term Goal  Description: Patient's Long Term Goal: stay out of hospital    Interventions:  - med compliance, follow ups  - trend labs/sugars/vitals    - See additional Care Plan goals for specific interventions  Outcome: Progressing  Goal: Patient/Family Short Term Goal  Description: Patient's Short Term Goal: sugar control  Round with Mds/discuss hospice vs palliative care 12-6    Interventions:   - trend sugars Q6, tube feedings, insulin protocol, fluids  - speak with sons/wife/MD in regards to plan of care- SW available     - See additional Care Plan goals for specific interventions  Outcome: Progressing     Problem: Delirium  Goal: Minimize duration of delirium  Description: Interventions:  - Encourage use of hearing aids, eye glasses  - Promote highest level of mobility daily  - Provide frequent reorientation  - Promote wakefulness i.e. lights on, blinds open  - Promote sleep, encourage patient's normal rest cycle i.e. lights off, TV off, minimize noise and interruptions  - Encourage family to assist in orientation and promotion of home routines  Outcome: Progressing Problem: CARDIOVASCULAR - ADULT  Goal: Maintains optimal cardiac output and hemodynamic stability  Description: INTERVENTIONS:  - Monitor vital signs, rhythm, and trends  - Monitor for bleeding, hypotension and signs of decreased cardiac output  - Evaluate effectiveness of vasoactive medications to optimize hemodynamic stability  - Monitor arterial and/or venous puncture sites for bleeding and/or hematoma  - Assess quality of pulses, skin color and temperature  - Assess for signs of decreased coronary artery perfusion - ex.  Angina  - Evaluate fluid balance, assess for edema, trend weights  Outcome: Progressing     Problem: METABOLIC/FLUID AND ELECTROLYTES - ADULT  Goal: Glucose maintained within prescribed range  Description: INTERVENTIONS:  - Monitor Blood Glucose as ordered  - Assess for signs and symptoms of hyperglycemia and hypoglycemia  - Administer ordered medications to maintain glucose within target range  - Assess barriers to adequate nutritional intake and initiate nutrition consult as needed  - Instruct patient on self management of diabetes  Outcome: Not Progressing  Goal: Electrolytes maintained within normal limits  Description: INTERVENTIONS:  - Monitor labs and rhythm and assess patient for signs and symptoms of electrolyte imbalances  - Administer electrolyte replacement as ordered  - Monitor response to electrolyte replacements, including rhythm and repeat lab results as appropriate  - Fluid restriction as ordered  - Instruct patient on fluid and nutrition restrictions as appropriate  Outcome: Progressing  Goal: Hemodynamic stability and optimal renal function maintained  Description: INTERVENTIONS:  - Monitor labs and assess for signs and symptoms of volume excess or deficit  - Monitor intake, output and patient weight  - Monitor urine specific gravity, serum osmolarity and serum sodium as indicated or ordered  - Monitor response to interventions for patient's volume status, including labs, urine output, blood pressure (other measures as available)  - Encourage oral intake as appropriate  - Instruct patient on fluid and nutrition restrictions as appropriate  Outcome: Progressing     Problem: SKIN/TISSUE INTEGRITY - ADULT  Goal: Skin integrity remains intact  Description: INTERVENTIONS  - Assess and document risk factors for pressure ulcer development  - Assess and document skin integrity  - Monitor for areas of redness and/or skin breakdown  - Initiate interventions, skin care algorithm/standards of care as needed  Outcome: Progressing     Problem: Safety Risk - Non-Violent Restraints  Goal: Patient will remain free from self-harm  Description: INTERVENTIONS:  - Apply the least restrictive restraint to prevent harm  - Notify patient and family of reasons restraints applied  - Assess for any contributing factors to confusion (electrolyte disturbances, delirium, medications)  - Discontinue any unnecessary medical devices as soon as possible  - Assess the patient's physical comfort, circulation, skin condition, hydration, nutrition and elimination needs   - Reorient and redirection as needed  - Assess for the need to continue restraints  Outcome: Progressing

## 2023-12-07 LAB
ANION GAP SERPL CALC-SCNC: 6 MMOL/L (ref 0–18)
BASOPHILS # BLD AUTO: 0.02 X10(3) UL (ref 0–0.2)
BASOPHILS NFR BLD AUTO: 0.2 %
BUN BLD-MCNC: 59 MG/DL (ref 9–23)
CALCIUM BLD-MCNC: 8.5 MG/DL (ref 8.5–10.1)
CHLORIDE SERPL-SCNC: 102 MMOL/L (ref 98–112)
CO2 SERPL-SCNC: 29 MMOL/L (ref 21–32)
CREAT BLD-MCNC: 2.03 MG/DL
EGFRCR SERPLBLD CKD-EPI 2021: 31 ML/MIN/1.73M2 (ref 60–?)
EOSINOPHIL # BLD AUTO: 0.09 X10(3) UL (ref 0–0.7)
EOSINOPHIL NFR BLD AUTO: 0.8 %
ERYTHROCYTE [DISTWIDTH] IN BLOOD BY AUTOMATED COUNT: 13.4 %
GLUCOSE BLD-MCNC: 149 MG/DL (ref 70–99)
GLUCOSE BLD-MCNC: 159 MG/DL (ref 70–99)
GLUCOSE BLD-MCNC: 173 MG/DL (ref 70–99)
GLUCOSE BLD-MCNC: 211 MG/DL (ref 70–99)
GLUCOSE BLD-MCNC: 225 MG/DL (ref 70–99)
HCT VFR BLD AUTO: 22.9 %
HGB BLD-MCNC: 7.5 G/DL
IMM GRANULOCYTES # BLD AUTO: 0.07 X10(3) UL (ref 0–1)
IMM GRANULOCYTES NFR BLD: 0.6 %
LYMPHOCYTES # BLD AUTO: 1.27 X10(3) UL (ref 1–4)
LYMPHOCYTES NFR BLD AUTO: 10.6 %
MCH RBC QN AUTO: 31 PG (ref 26–34)
MCHC RBC AUTO-ENTMCNC: 32.8 G/DL (ref 31–37)
MCV RBC AUTO: 94.6 FL
MONOCYTES # BLD AUTO: 0.53 X10(3) UL (ref 0.1–1)
MONOCYTES NFR BLD AUTO: 4.4 %
NEUTROPHILS # BLD AUTO: 10.02 X10 (3) UL (ref 1.5–7.7)
NEUTROPHILS # BLD AUTO: 10.02 X10(3) UL (ref 1.5–7.7)
NEUTROPHILS NFR BLD AUTO: 83.4 %
OSMOLALITY SERPL CALC.SUM OF ELEC: 303 MOSM/KG (ref 275–295)
PLATELET # BLD AUTO: 387 10(3)UL (ref 150–450)
POTASSIUM SERPL-SCNC: 4.1 MMOL/L (ref 3.5–5.1)
RBC # BLD AUTO: 2.42 X10(6)UL
SODIUM SERPL-SCNC: 137 MMOL/L (ref 136–145)
WBC # BLD AUTO: 12 X10(3) UL (ref 4–11)

## 2023-12-07 NOTE — PLAN OF CARE
Pt lethargic, nonverbal is base line withdraw from pain stimulate. On room air, NSR on tele. Breath sound diminished, weak/congested cough. Incontinent bladder and bowl, male purewick in place. No BM today  NPO. Dobhoff NG tube inserted, tube secured at 64 cm. Tube feeding goal at 45ml/hr, 200 ML flush q4 hours. LR infusing at 75 ml/hr continuous. Bed alarm on, call light within reach.  All needs meet at this time     Problem: Diabetes/Glucose Control  Goal: Glucose maintained within prescribed range  Description: INTERVENTIONS:  - Monitor Blood Glucose as ordered  - Assess for signs and symptoms of hyperglycemia and hypoglycemia  - Administer ordered medications to maintain glucose within target range  - Assess barriers to adequate nutritional intake and initiate nutrition consult as needed  - Instruct patient on self management of diabetes  Outcome: Progressing     Problem: Patient/Family Goals  Goal: Patient/Family Long Term Goal  Description: Patient's Long Term Goal: stay out of hospital    Interventions:  - med compliance, follow ups  - trend labs/sugars/vitals    - See additional Care Plan goals for specific interventions  Outcome: Progressing  Goal: Patient/Family Short Term Goal  Description: Patient's Short Term Goal: sugar control  Round with Mds/discuss hospice vs palliative care 12-6    Interventions:   - trend sugars Q6, tube feedings, insulin protocol, fluids  - speak with sons/wife/MD in regards to plan of care- SW available     - See additional Care Plan goals for specific interventions  Outcome: Progressing     Problem: Delirium  Goal: Minimize duration of delirium  Description: Interventions:  - Encourage use of hearing aids, eye glasses  - Promote highest level of mobility daily  - Provide frequent reorientation  - Promote wakefulness i.e. lights on, blinds open  - Promote sleep, encourage patient's normal rest cycle i.e. lights off, TV off, minimize noise and interruptions  - Encourage family to assist in orientation and promotion of home routines  Outcome: Progressing     Problem: CARDIOVASCULAR - ADULT  Goal: Maintains optimal cardiac output and hemodynamic stability  Description: INTERVENTIONS:  - Monitor vital signs, rhythm, and trends  - Monitor for bleeding, hypotension and signs of decreased cardiac output  - Evaluate effectiveness of vasoactive medications to optimize hemodynamic stability  - Monitor arterial and/or venous puncture sites for bleeding and/or hematoma  - Assess quality of pulses, skin color and temperature  - Assess for signs of decreased coronary artery perfusion - ex.  Angina  - Evaluate fluid balance, assess for edema, trend weights  Outcome: Progressing     Problem: METABOLIC/FLUID AND ELECTROLYTES - ADULT  Goal: Glucose maintained within prescribed range  Description: INTERVENTIONS:  - Monitor Blood Glucose as ordered  - Assess for signs and symptoms of hyperglycemia and hypoglycemia  - Administer ordered medications to maintain glucose within target range  - Assess barriers to adequate nutritional intake and initiate nutrition consult as needed  - Instruct patient on self management of diabetes  Outcome: Progressing  Goal: Electrolytes maintained within normal limits  Description: INTERVENTIONS:  - Monitor labs and rhythm and assess patient for signs and symptoms of electrolyte imbalances  - Administer electrolyte replacement as ordered  - Monitor response to electrolyte replacements, including rhythm and repeat lab results as appropriate  - Fluid restriction as ordered  - Instruct patient on fluid and nutrition restrictions as appropriate  Outcome: Progressing  Goal: Hemodynamic stability and optimal renal function maintained  Description: INTERVENTIONS:  - Monitor labs and assess for signs and symptoms of volume excess or deficit  - Monitor intake, output and patient weight  - Monitor urine specific gravity, serum osmolarity and serum sodium as indicated or ordered  - Monitor response to interventions for patient's volume status, including labs, urine output, blood pressure (other measures as available)  - Encourage oral intake as appropriate  - Instruct patient on fluid and nutrition restrictions as appropriate  Outcome: Progressing     Problem: SKIN/TISSUE INTEGRITY - ADULT  Goal: Skin integrity remains intact  Description: INTERVENTIONS  - Assess and document risk factors for pressure ulcer development  - Assess and document skin integrity  - Monitor for areas of redness and/or skin breakdown  - Initiate interventions, skin care algorithm/standards of care as needed  Outcome: Progressing     Problem: Safety Risk - Non-Violent Restraints  Goal: Patient will remain free from self-harm  Description: INTERVENTIONS:  - Apply the least restrictive restraint to prevent harm  - Notify patient and family of reasons restraints applied  - Assess for any contributing factors to confusion (electrolyte disturbances, delirium, medications)  - Discontinue any unnecessary medical devices as soon as possible  - Assess the patient's physical comfort, circulation, skin condition, hydration, nutrition and elimination needs   - Reorient and redirection as needed  - Assess for the need to continue restraints  Outcome: Progressing

## 2023-12-07 NOTE — PLAN OF CARE
Received pt at 0700. Pt lethargic, is non-verbal. Pt is on room air, lungs are diminished. He is in NSR. Incontinent of B&B, male purewick in place. Edema present at [address]. Redness at sacrum- mepilex reinforced. Dobhoff in place at 64 cm, tube feeds running at 45 ml/hour with 200 ml flushes every 4 hours. LR running at 75 ml/hour.      Problem: Diabetes/Glucose Control  Goal: Glucose maintained within prescribed range  Description: INTERVENTIONS:  - Monitor Blood Glucose as ordered  - Assess for signs and symptoms of hyperglycemia and hypoglycemia  - Administer ordered medications to maintain glucose within target range  - Assess barriers to adequate nutritional intake and initiate nutrition consult as needed  - Instruct patient on self management of diabetes  Outcome: Progressing     Problem: Patient/Family Goals  Goal: Patient/Family Long Term Goal  Description: Patient's Long Term Goal: stay out of hospital    Interventions:  - med compliance, follow ups  - trend labs/sugars/vitals    - See additional Care Plan goals for specific interventions  Outcome: Progressing  Goal: Patient/Family Short Term Goal  Description: Patient's Short Term Goal: sugar control  Round with Mds/discuss hospice vs palliative care 12-6  Review case with family and MD's 12-7    Interventions:   - trend sugars Q6, tube feedings, insulin protocol, fluids  - speak with sons/wife/MD in regards to plan of care- SW available   - discuss medical options    - See additional Care Plan goals for specific interventions  Outcome: Progressing     Problem: Delirium  Goal: Minimize duration of delirium  Description: Interventions:  - Encourage use of hearing aids, eye glasses  - Promote highest level of mobility daily  - Provide frequent reorientation  - Promote wakefulness i.e. lights on, blinds open  - Promote sleep, encourage patient's normal rest cycle i.e. lights off, TV off, minimize noise and interruptions  - Encourage family to assist in orientation and promotion of home routines  Outcome: Progressing     Problem: CARDIOVASCULAR - ADULT  Goal: Maintains optimal cardiac output and hemodynamic stability  Description: INTERVENTIONS:  - Monitor vital signs, rhythm, and trends  - Monitor for bleeding, hypotension and signs of decreased cardiac output  - Evaluate effectiveness of vasoactive medications to optimize hemodynamic stability  - Monitor arterial and/or venous puncture sites for bleeding and/or hematoma  - Assess quality of pulses, skin color and temperature  - Assess for signs of decreased coronary artery perfusion - ex.  Angina  - Evaluate fluid balance, assess for edema, trend weights  Outcome: Progressing     Problem: METABOLIC/FLUID AND ELECTROLYTES - ADULT  Goal: Glucose maintained within prescribed range  Description: INTERVENTIONS:  - Monitor Blood Glucose as ordered  - Assess for signs and symptoms of hyperglycemia and hypoglycemia  - Administer ordered medications to maintain glucose within target range  - Assess barriers to adequate nutritional intake and initiate nutrition consult as needed  - Instruct patient on self management of diabetes  Outcome: Progressing  Goal: Electrolytes maintained within normal limits  Description: INTERVENTIONS:  - Monitor labs and rhythm and assess patient for signs and symptoms of electrolyte imbalances  - Administer electrolyte replacement as ordered  - Monitor response to electrolyte replacements, including rhythm and repeat lab results as appropriate  - Fluid restriction as ordered  - Instruct patient on fluid and nutrition restrictions as appropriate  Outcome: Progressing  Goal: Hemodynamic stability and optimal renal function maintained  Description: INTERVENTIONS:  - Monitor labs and assess for signs and symptoms of volume excess or deficit  - Monitor intake, output and patient weight  - Monitor urine specific gravity, serum osmolarity and serum sodium as indicated or ordered  - Monitor response to interventions for patient's volume status, including labs, urine output, blood pressure (other measures as available)  - Encourage oral intake as appropriate  - Instruct patient on fluid and nutrition restrictions as appropriate  Outcome: Progressing     Problem: SKIN/TISSUE INTEGRITY - ADULT  Goal: Skin integrity remains intact  Description: INTERVENTIONS  - Assess and document risk factors for pressure ulcer development  - Assess and document skin integrity  - Monitor for areas of redness and/or skin breakdown  - Initiate interventions, skin care algorithm/standards of care as needed  Outcome: Progressing     Problem: Safety Risk - Non-Violent Restraints  Goal: Patient will remain free from self-harm  Description: INTERVENTIONS:  - Apply the least restrictive restraint to prevent harm  - Notify patient and family of reasons restraints applied  - Assess for any contributing factors to confusion (electrolyte disturbances, delirium, medications)  - Discontinue any unnecessary medical devices as soon as possible  - Assess the patient's physical comfort, circulation, skin condition, hydration, nutrition and elimination needs   - Reorient and redirection as needed  - Assess for the need to continue restraints  Outcome: Progressing

## 2023-12-08 ENCOUNTER — TELEPHONE (OUTPATIENT)
Dept: CASE MANAGEMENT | Facility: HOSPITAL | Age: 88
End: 2023-12-08

## 2023-12-08 VITALS
RESPIRATION RATE: 16 BRPM | TEMPERATURE: 99 F | SYSTOLIC BLOOD PRESSURE: 162 MMHG | WEIGHT: 119.69 LBS | BODY MASS INDEX: 19.24 KG/M2 | HEIGHT: 66 IN | DIASTOLIC BLOOD PRESSURE: 60 MMHG | HEART RATE: 63 BPM | OXYGEN SATURATION: 98 %

## 2023-12-08 LAB
ANION GAP SERPL CALC-SCNC: 7 MMOL/L (ref 0–18)
BASOPHILS # BLD AUTO: 0.03 X10(3) UL (ref 0–0.2)
BASOPHILS NFR BLD AUTO: 0.3 %
BUN BLD-MCNC: 56 MG/DL (ref 9–23)
CALCIUM BLD-MCNC: 8.5 MG/DL (ref 8.5–10.1)
CHLORIDE SERPL-SCNC: 100 MMOL/L (ref 98–112)
CO2 SERPL-SCNC: 31 MMOL/L (ref 21–32)
CREAT BLD-MCNC: 2.01 MG/DL
EGFRCR SERPLBLD CKD-EPI 2021: 31 ML/MIN/1.73M2 (ref 60–?)
EOSINOPHIL # BLD AUTO: 0.11 X10(3) UL (ref 0–0.7)
EOSINOPHIL NFR BLD AUTO: 1 %
ERYTHROCYTE [DISTWIDTH] IN BLOOD BY AUTOMATED COUNT: 13.5 %
GLUCOSE BLD-MCNC: 113 MG/DL (ref 70–99)
GLUCOSE BLD-MCNC: 119 MG/DL (ref 70–99)
GLUCOSE BLD-MCNC: 168 MG/DL (ref 70–99)
GLUCOSE BLD-MCNC: 217 MG/DL (ref 70–99)
HCT VFR BLD AUTO: 24.5 %
HGB BLD-MCNC: 7.6 G/DL
IMM GRANULOCYTES # BLD AUTO: 0.04 X10(3) UL (ref 0–1)
IMM GRANULOCYTES NFR BLD: 0.4 %
LYMPHOCYTES # BLD AUTO: 0.81 X10(3) UL (ref 1–4)
LYMPHOCYTES NFR BLD AUTO: 7.3 %
MCH RBC QN AUTO: 30.3 PG (ref 26–34)
MCHC RBC AUTO-ENTMCNC: 31 G/DL (ref 31–37)
MCV RBC AUTO: 97.6 FL
MONOCYTES # BLD AUTO: 0.47 X10(3) UL (ref 0.1–1)
MONOCYTES NFR BLD AUTO: 4.2 %
NEUTROPHILS # BLD AUTO: 9.61 X10 (3) UL (ref 1.5–7.7)
NEUTROPHILS # BLD AUTO: 9.61 X10(3) UL (ref 1.5–7.7)
NEUTROPHILS NFR BLD AUTO: 86.8 %
OSMOLALITY SERPL CALC.SUM OF ELEC: 303 MOSM/KG (ref 275–295)
PLATELET # BLD AUTO: 390 10(3)UL (ref 150–450)
POTASSIUM SERPL-SCNC: 3.9 MMOL/L (ref 3.5–5.1)
RBC # BLD AUTO: 2.51 X10(6)UL
SODIUM SERPL-SCNC: 138 MMOL/L (ref 136–145)
WBC # BLD AUTO: 11.1 X10(3) UL (ref 4–11)

## 2023-12-08 RX ORDER — DILTIAZEM HYDROCHLORIDE 60 MG/1
60 TABLET, FILM COATED ORAL EVERY 8 HOURS SCHEDULED
Status: DISCONTINUED | OUTPATIENT
Start: 2023-12-08 | End: 2023-12-08

## 2023-12-08 RX ORDER — DILTIAZEM HYDROCHLORIDE 60 MG/1
60 TABLET, FILM COATED ORAL EVERY 8 HOURS SCHEDULED
Qty: 90 TABLET | Refills: 0 | Status: SHIPPED | OUTPATIENT
Start: 2023-12-08

## 2023-12-08 NOTE — PROGRESS NOTES
NURSING DISCHARGE NOTE    Discharged Home via Ambulance. Accompanied by Support staff  Belongings Taken by patient/family. Pt is assessed and ready for discharge. Instructions gone over with patients son. To DC to [facility]. Report called to facility. To Facility via ambulance.

## 2023-12-08 NOTE — CM/SW NOTE
Confirmed with Zaira at [facility] that LOGAN MENSAH can be accommodated at their facility. Spoke with Ericpawellinda Matt at [facility] ([phone]) who confirms she will review referral sent in [address] [place] and can follow up with pt's son today. Informed Albanialinad Shaka that pt should be ready to return to [facility]- today once hospice consents signed. She will follow up with CM once she reviews referral and speaks with son. Addendum: Notified that son will be at bedside today at [facility] with Valorie spoke with the son and he agrees to meet when he arrives. Updated [facility] that pt likely will return today once hospice consents are signed. Anuja Matt confirmed that if son signs consent they can admit him to hospice at [facility] today. BLS arranged for 4 pm. PCS updated and available for RN pt.     RN to call report: [facility] [phone]: Anuja Matt with [facility] met with son and he signed consents for hospice. Updated RN/CM.  Plans for discharge back to [facility]- today at 4 pm.    GENNY KnappN, RN-BC    [id number]

## 2023-12-08 NOTE — CM/SW NOTE
Apparently [facility] unable to follow patient @ [facility] where patient is a LTR.   Call out to liaison Nelson Botello @ facility to check on other agencies facility works with--await call back

## 2023-12-08 NOTE — PLAN OF CARE
Assumed care of patient at 1. At baseline patient is nonverbal. Pt lethargic, opens eyes to painful stimuli. On room air with adequate o2 saturations. NSR on tele. Incontinent of bowels and bladder. Male Princella Mew in place with good output. Pt on tube feeds at goal, feeds through dobhoff. Total care. Needs met at this time. Bed locked and in lowest position, call light within reach.     Plan:  -hospice evaluation with patient's son    Problem: Diabetes/Glucose Control  Goal: Glucose maintained within prescribed range  Description: INTERVENTIONS:  - Monitor Blood Glucose as ordered  - Assess for signs and symptoms of hyperglycemia and hypoglycemia  - Administer ordered medications to maintain glucose within target range  - Assess barriers to adequate nutritional intake and initiate nutrition consult as needed  - Instruct patient on self management of diabetes  Outcome: Progressing     Problem: Patient/Family Goals  Goal: Patient/Family Long Term Goal  Description: Patient's Long Term Goal: stay out of hospital    Interventions:  - med compliance, follow ups  - trend labs/sugars/vitals    - See additional Care Plan goals for specific interventions  Outcome: Progressing  Goal: Patient/Family Short Term Goal  Description: Patient's Short Term Goal: sugar control  Round with Mds/discuss hospice vs palliative care 12-6  Review case with family and MD's 12-7    Interventions:   - trend sugars Q6, tube feedings, insulin protocol, fluids  - speak with sons/wife/MD in regards to plan of care- SW available   - discuss medical options    - See additional Care Plan goals for specific interventions  Outcome: Progressing     Problem: Delirium  Goal: Minimize duration of delirium  Description: Interventions:  - Encourage use of hearing aids, eye glasses  - Promote highest level of mobility daily  - Provide frequent reorientation  - Promote wakefulness i.e. lights on, blinds open  - Promote sleep, encourage patient's normal rest cycle i.e. lights off, TV off, minimize noise and interruptions  - Encourage family to assist in orientation and promotion of home routines  Outcome: Progressing     Problem: CARDIOVASCULAR - ADULT  Goal: Maintains optimal cardiac output and hemodynamic stability  Description: INTERVENTIONS:  - Monitor vital signs, rhythm, and trends  - Monitor for bleeding, hypotension and signs of decreased cardiac output  - Evaluate effectiveness of vasoactive medications to optimize hemodynamic stability  - Monitor arterial and/or venous puncture sites for bleeding and/or hematoma  - Assess quality of pulses, skin color and temperature  - Assess for signs of decreased coronary artery perfusion - ex.  Angina  - Evaluate fluid balance, assess for edema, trend weights  Outcome: Progressing     Problem: METABOLIC/FLUID AND ELECTROLYTES - ADULT  Goal: Glucose maintained within prescribed range  Description: INTERVENTIONS:  - Monitor Blood Glucose as ordered  - Assess for signs and symptoms of hyperglycemia and hypoglycemia  - Administer ordered medications to maintain glucose within target range  - Assess barriers to adequate nutritional intake and initiate nutrition consult as needed  - Instruct patient on self management of diabetes  Outcome: Progressing  Goal: Electrolytes maintained within normal limits  Description: INTERVENTIONS:  - Monitor labs and rhythm and assess patient for signs and symptoms of electrolyte imbalances  - Administer electrolyte replacement as ordered  - Monitor response to electrolyte replacements, including rhythm and repeat lab results as appropriate  - Fluid restriction as ordered  - Instruct patient on fluid and nutrition restrictions as appropriate  Outcome: Progressing  Goal: Hemodynamic stability and optimal renal function maintained  Description: INTERVENTIONS:  - Monitor labs and assess for signs and symptoms of volume excess or deficit  - Monitor intake, output and patient weight  - Monitor urine specific gravity, serum osmolarity and serum sodium as indicated or ordered  - Monitor response to interventions for patient's volume status, including labs, urine output, blood pressure (other measures as available)  - Encourage oral intake as appropriate  - Instruct patient on fluid and nutrition restrictions as appropriate  Outcome: Progressing     Problem: SKIN/TISSUE INTEGRITY - ADULT  Goal: Skin integrity remains intact  Description: INTERVENTIONS  - Assess and document risk factors for pressure ulcer development  - Assess and document skin integrity  - Monitor for areas of redness and/or skin breakdown  - Initiate interventions, skin care algorithm/standards of care as needed  Outcome: Progressing     Problem: Safety Risk - Non-Violent Restraints  Goal: Patient will remain free from self-harm  Description: INTERVENTIONS:  - Apply the least restrictive restraint to prevent harm  - Notify patient and family of reasons restraints applied  - Assess for any contributing factors to confusion (electrolyte disturbances, delirium, medications)  - Discontinue any unnecessary medical devices as soon as possible  - Assess the patient's physical comfort, circulation, skin condition, hydration, nutrition and elimination needs   - Reorient and redirection as needed  - Assess for the need to continue restraints  Outcome: Progressing

## 2023-12-08 NOTE — PLAN OF CARE
Problem: Diabetes/Glucose Control  Goal: Glucose maintained within prescribed range  Description: INTERVENTIONS:  - Monitor Blood Glucose as ordered  - Assess for signs and symptoms of hyperglycemia and hypoglycemia  - Administer ordered medications to maintain glucose within target range  - Assess barriers to adequate nutritional intake and initiate nutrition consult as needed  - Instruct patient on self management of diabetes  Outcome: Adequate for Discharge     Problem: Patient/Family Goals  Goal: Patient/Family Long Term Goal  Description: Patient's Long Term Goal: stay out of hospital    Interventions:  - med compliance, follow ups  - trend labs/sugars/vitals    - See additional Care Plan goals for specific interventions  Outcome: Adequate for Discharge  Goal: Patient/Family Short Term Goal  Description: Patient's Short Term Goal: sugar control  Round with Mds/discuss hospice vs palliative care 12-6  Review case with family and MD's 12-7    Interventions:   - trend sugars Q6, tube feedings, insulin protocol, fluids  - speak with sons/wife/MD in regards to plan of care- SW available   - discuss medical options    - See additional Care Plan goals for specific interventions  Outcome: Adequate for Discharge     Problem: Delirium  Goal: Minimize duration of delirium  Description: Interventions:  - Encourage use of hearing aids, eye glasses  - Promote highest level of mobility daily  - Provide frequent reorientation  - Promote wakefulness i.e. lights on, blinds open  - Promote sleep, encourage patient's normal rest cycle i.e. lights off, TV off, minimize noise and interruptions  - Encourage family to assist in orientation and promotion of home routines  Outcome: Adequate for Discharge     Problem: CARDIOVASCULAR - ADULT  Goal: Maintains optimal cardiac output and hemodynamic stability  Description: INTERVENTIONS:  - Monitor vital signs, rhythm, and trends  - Monitor for bleeding, hypotension and signs of decreased cardiac output  - Evaluate effectiveness of vasoactive medications to optimize hemodynamic stability  - Monitor arterial and/or venous puncture sites for bleeding and/or hematoma  - Assess quality of pulses, skin color and temperature  - Assess for signs of decreased coronary artery perfusion - ex.  Angina  - Evaluate fluid balance, assess for edema, trend weights  Outcome: Adequate for Discharge     Problem: METABOLIC/FLUID AND ELECTROLYTES - ADULT  Goal: Glucose maintained within prescribed range  Description: INTERVENTIONS:  - Monitor Blood Glucose as ordered  - Assess for signs and symptoms of hyperglycemia and hypoglycemia  - Administer ordered medications to maintain glucose within target range  - Assess barriers to adequate nutritional intake and initiate nutrition consult as needed  - Instruct patient on self management of diabetes  Outcome: Adequate for Discharge  Goal: Electrolytes maintained within normal limits  Description: INTERVENTIONS:  - Monitor labs and rhythm and assess patient for signs and symptoms of electrolyte imbalances  - Administer electrolyte replacement as ordered  - Monitor response to electrolyte replacements, including rhythm and repeat lab results as appropriate  - Fluid restriction as ordered  - Instruct patient on fluid and nutrition restrictions as appropriate  Outcome: Adequate for Discharge  Goal: Hemodynamic stability and optimal renal function maintained  Description: INTERVENTIONS:  - Monitor labs and assess for signs and symptoms of volume excess or deficit  - Monitor intake, output and patient weight  - Monitor urine specific gravity, serum osmolarity and serum sodium as indicated or ordered  - Monitor response to interventions for patient's volume status, including labs, urine output, blood pressure (other measures as available)  - Encourage oral intake as appropriate  - Instruct patient on fluid and nutrition restrictions as appropriate  Outcome: Adequate for Discharge Problem: SKIN/TISSUE INTEGRITY - ADULT  Goal: Skin integrity remains intact  Description: INTERVENTIONS  - Assess and document risk factors for pressure ulcer development  - Assess and document skin integrity  - Monitor for areas of redness and/or skin breakdown  - Initiate interventions, skin care algorithm/standards of care as needed  Outcome: Adequate for Discharge     Problem: Safety Risk - Non-Violent Restraints  Goal: Patient will remain free from self-harm  Description: INTERVENTIONS:  - Apply the least restrictive restraint to prevent harm  - Notify patient and family of reasons restraints applied  - Assess for any contributing factors to confusion (electrolyte disturbances, delirium, medications)  - Discontinue any unnecessary medical devices as soon as possible  - Assess the patient's physical comfort, circulation, skin condition, hydration, nutrition and elimination needs   - Reorient and redirection as needed  - Assess for the need to continue restraints  Outcome: Adequate for Discharge

## (undated) NOTE — LETTER
[facility] FOR BLOOD OR BLOOD COMPONENTS      In the course of your treatment, it may become necessary to administer a transfusion of blood or blood components. This form provides basic information concerning this procedure and, if signed by you, authorizes its performance by qualified medical personnel. DESCRIPTION OF PROCEDURE:  Blood is introduced into one of your veins, commonly in the arm, using a sterilized disposable needle. The amount of blood transfused, and whether the transfusion will be of blood or blood components is a judgment the physician will make based on your particular needs. RISKS:  The transfusion is a common procedure of low risk. MINOR AND TEMPORARY REACTIONS ARE NOT UNCOMMON, including a slight bruise, swelling or local reaction in the area where the needle pierces your skin, or a non-serious reaction to the transfused material itself, including headache, fever or a mild skin reaction, such as rash. Serious reactions are possible, though very unlikely and include severe allergic reaction (shock)  and destruction (hemolysis) of transfused blood cells. Infectious diseases which are known to be transmitted by blood transfusion include CERTAIN TYPES OF VIRAL HEPATITIS, a viral infection of the liver, HUMAN IMMUNODEFICIENCY VIRUS (HIV-1,2) infection, a viral infection known to cause ACQUIRED IMMUNODEFICIENCY SYNDROME (AIDS) AS WELL AS CERTAIN OTHER BACTERIAL, VIRAL AND PARASITIC DISEASES. While a minimal risk of acquiring an infectious disease from transfused blood exists, in accordance with Federal and State law all due care has been taken in donor selection and testing to avoid transmission of disease. ALTERNATIVES:  If loss of blood poses serious threats in the course of your treatment, THERE IS NO EFFECTIVE ALTERNATIVE TO BLOOD TRANSFUSION.  However, if you have any further questions on this matter, your physician will fully explain the alternatives to you if it has not already been done. Venu Johnson, have read/had read to me the above. I understand the matters bearing on the decision whether or not to authorize a transfusion of blood or blood components. I have no questions which have not been answered to my full satisfaction.  I hereby consent to such transfusion as  my physician may deem necessary or advisable in the course of my treatment.        _______________   __________________________________________________  Date     Signature of Patient, Parent or Legal Guardian      ([place] [facility])      __________________________________________  Witness to Signature (title or relationship to patient)    Patient Name: Rich Caro     : 8/15/1933                 Printed: August 3, 2023     Medical Record #: [id number]                    Page 1 of 1